# Patient Record
Sex: FEMALE | Race: WHITE
[De-identification: names, ages, dates, MRNs, and addresses within clinical notes are randomized per-mention and may not be internally consistent; named-entity substitution may affect disease eponyms.]

---

## 2018-04-17 ENCOUNTER — HOSPITAL ENCOUNTER (INPATIENT)
Dept: HOSPITAL 17 - NEPC | Age: 63
LOS: 7 days | Discharge: ANOTHER HOSPITAL | DRG: 885 | End: 2018-04-24
Attending: PSYCHIATRY & NEUROLOGY | Admitting: PSYCHIATRY & NEUROLOGY
Payer: COMMERCIAL

## 2018-04-17 VITALS
OXYGEN SATURATION: 96 % | RESPIRATION RATE: 18 BRPM | DIASTOLIC BLOOD PRESSURE: 91 MMHG | HEART RATE: 108 BPM | TEMPERATURE: 96.7 F | SYSTOLIC BLOOD PRESSURE: 204 MMHG

## 2018-04-17 VITALS
OXYGEN SATURATION: 96 % | DIASTOLIC BLOOD PRESSURE: 113 MMHG | SYSTOLIC BLOOD PRESSURE: 198 MMHG | HEART RATE: 111 BPM | RESPIRATION RATE: 18 BRPM

## 2018-04-17 DIAGNOSIS — E78.00: ICD-10-CM

## 2018-04-17 DIAGNOSIS — E78.5: ICD-10-CM

## 2018-04-17 DIAGNOSIS — F05: ICD-10-CM

## 2018-04-17 DIAGNOSIS — N39.0: ICD-10-CM

## 2018-04-17 DIAGNOSIS — M79.606: ICD-10-CM

## 2018-04-17 DIAGNOSIS — G62.9: ICD-10-CM

## 2018-04-17 DIAGNOSIS — E03.9: ICD-10-CM

## 2018-04-17 DIAGNOSIS — B96.20: ICD-10-CM

## 2018-04-17 DIAGNOSIS — R45.851: ICD-10-CM

## 2018-04-17 DIAGNOSIS — F17.210: ICD-10-CM

## 2018-04-17 DIAGNOSIS — F41.1: ICD-10-CM

## 2018-04-17 DIAGNOSIS — Z96.0: ICD-10-CM

## 2018-04-17 DIAGNOSIS — F41.8: ICD-10-CM

## 2018-04-17 DIAGNOSIS — M62.838: ICD-10-CM

## 2018-04-17 DIAGNOSIS — K21.9: ICD-10-CM

## 2018-04-17 DIAGNOSIS — I12.9: ICD-10-CM

## 2018-04-17 DIAGNOSIS — N17.9: ICD-10-CM

## 2018-04-17 DIAGNOSIS — E86.0: ICD-10-CM

## 2018-04-17 DIAGNOSIS — G25.9: ICD-10-CM

## 2018-04-17 DIAGNOSIS — Z66: ICD-10-CM

## 2018-04-17 DIAGNOSIS — B96.4: ICD-10-CM

## 2018-04-17 DIAGNOSIS — F29: Primary | ICD-10-CM

## 2018-04-17 DIAGNOSIS — R56.9: ICD-10-CM

## 2018-04-17 DIAGNOSIS — N31.9: ICD-10-CM

## 2018-04-17 DIAGNOSIS — G35: ICD-10-CM

## 2018-04-17 DIAGNOSIS — R40.20: ICD-10-CM

## 2018-04-17 DIAGNOSIS — Z79.899: ICD-10-CM

## 2018-04-17 DIAGNOSIS — G82.20: ICD-10-CM

## 2018-04-17 DIAGNOSIS — N18.3: ICD-10-CM

## 2018-04-17 LAB
ALBUMIN SERPL-MCNC: 3.1 GM/DL (ref 3.4–5)
ALP SERPL-CCNC: 112 U/L (ref 45–117)
ALT SERPL-CCNC: 28 U/L (ref 10–53)
AST SERPL-CCNC: 25 U/L (ref 15–37)
BILIRUB SERPL-MCNC: 0.4 MG/DL (ref 0.2–1)
BUN SERPL-MCNC: 26 MG/DL (ref 7–18)
CALCIUM SERPL-MCNC: 10.2 MG/DL (ref 8.5–10.1)
CHLORIDE SERPL-SCNC: 107 MEQ/L (ref 98–107)
CREAT SERPL-MCNC: 1.44 MG/DL (ref 0.5–1)
GFR SERPLBLD BASED ON 1.73 SQ M-ARVRAT: 37 ML/MIN (ref 89–?)
GLUCOSE SERPL-MCNC: 181 MG/DL (ref 74–106)
HCO3 BLD-SCNC: 23.3 MEQ/L (ref 21–32)
PROT SERPL-MCNC: 8.1 GM/DL (ref 6.4–8.2)
SODIUM SERPL-SCNC: 142 MEQ/L (ref 136–145)

## 2018-04-17 PROCEDURE — 84100 ASSAY OF PHOSPHORUS: CPT

## 2018-04-17 PROCEDURE — 84443 ASSAY THYROID STIM HORMONE: CPT

## 2018-04-17 PROCEDURE — 80048 BASIC METABOLIC PNL TOTAL CA: CPT

## 2018-04-17 PROCEDURE — 80307 DRUG TEST PRSMV CHEM ANLYZR: CPT

## 2018-04-17 PROCEDURE — 80061 LIPID PANEL: CPT

## 2018-04-17 PROCEDURE — 80053 COMPREHEN METABOLIC PANEL: CPT

## 2018-04-17 PROCEDURE — 83036 HEMOGLOBIN GLYCOSYLATED A1C: CPT

## 2018-04-17 PROCEDURE — 87086 URINE CULTURE/COLONY COUNT: CPT

## 2018-04-17 PROCEDURE — 83735 ASSAY OF MAGNESIUM: CPT

## 2018-04-17 PROCEDURE — 87077 CULTURE AEROBIC IDENTIFY: CPT

## 2018-04-17 PROCEDURE — 84439 ASSAY OF FREE THYROXINE: CPT

## 2018-04-17 PROCEDURE — 76937 US GUIDE VASCULAR ACCESS: CPT

## 2018-04-17 PROCEDURE — 85025 COMPLETE CBC W/AUTO DIFF WBC: CPT

## 2018-04-17 PROCEDURE — 96372 THER/PROPH/DIAG INJ SC/IM: CPT

## 2018-04-17 PROCEDURE — 81001 URINALYSIS AUTO W/SCOPE: CPT

## 2018-04-17 PROCEDURE — 87186 SC STD MICRODIL/AGAR DIL: CPT

## 2018-04-17 NOTE — PD
HPI


Chief Complaint:  Psychiatric Symptoms


Time Seen by Provider:  17:44


Travel History


International Travel<30 days:  No


Contact w/Intl Traveler<30days:  No


Traveled to known affect area:  No





History of Present Illness


HPI


63-year-old female that presents to the ED for evaluation of psychiatric 

illness.  Patient was brought here under a Rodriges act initiated by the patient's 

provider secondary to psychosis and suicidal ideation.  Patient has a 

significant history of MS and currently lives at the [facility].  Patient also has a 

history of anxiety as well as depression and apparently has been having 

episodes of psychosis while in the nursing home.  She actually feels her 

records from the nursing home started on Risperdal.  She apparently has been 

evaluated by psychiatrist in the nursing home.  Apparently today patient was 

found by staff having the course of call bell on her neck allegedly attempting 

to commit suicide.  Patient has no signs of injury to her neck.  She denies any 

pain.  Patient will not really provide any information and initially started 

talking to me and answering questions and then gets aggravated and then stops 

talking to me.  She denies any suicidal or homicidal ideation.  She denies any 

other medical problem.  She would not tell me what kind of medical issues she 

has or what medication she takes.  Most of the history is really obtained from 

ED nurse report as well as from the nursing home report.  She is a DNR per 

nursing home report.  Symptoms appear to have escalated for the past week.  

Culminating today with her allegedly attempting to put a cord on her neck.





PFSH


Past Medical History


High Cholesterol:  Yes


Diminished Hearing:  No


Hypertension:  Yes


Musculoskeletal:  Yes (MS )


Tetanus Vaccination:  Unknown


Influenza Vaccination:  No


Pregnant?:  Not Pregnant





Past Surgical History


Tonsillectomy:  Yes





Social History


Alcohol Use:  No


Tobacco Use:  Yes (1-2 cig )


Substance Use:  No





Allergies-Medications


(Allergen,Severity, Reaction):  


Coded Allergies:  


     hydromorphone (Verified  Allergy, Severe, 4/17/18)


     midazolam (Verified  Allergy, Severe, 4/17/18)


Reported Meds & Prescriptions





Reported Meds & Active Scripts


Active


Reported


Vitamin B-12 (Cyanocobalamin) 500 Mcg Tab 500 Mcg PO DAILY


Tylenol (Acetaminophen) 325 Mg Tab 650 Mg PO Q6H PRN


Cranberry (Cranberry (Vaccinium Macrocarpon)) 425 Mg Cap 425 Mg PO DAILY


Neurontin (Gabapentin) 100 Mg Cap 100 Mg PO TID


Amlodipine (Amlodipine Besylate) 5 Mg Tab 5 Mg PO DAILY


Losartan (Losartan Potassium) 25 Mg Tab 25 Mg PO DAILY


D3 (Cholecalciferol) 2,000 Unit Tab 2,000 Units PO DAILY


Levothyroxine (Levothyroxine Sodium) 112 Mcg Tab 112 Mcg PO DAILY


Myrbetriq (Mirabegron) 25 Mg Tab 25 Mg PO HS


Xanax (Alprazolam) 0.5 Mg Tab 0.5 Mg PO BID


Norco (Hydrocodone-Acetaminophen) 5 Mg-325 Mg Tab 1 Tab PO BID


Multiple Vitamin 1 Tab 1 Tab PO DAILY


Toprol XL (Metoprolol Succinate) 25 Mg Tab 25 Mg PO DAILY


     Hold for SBP < 110 or pulse < 60


Cymbalta DR (Duloxetine HCl) 60 Mg Capdr 60 Mg PO BID


Wellbutrin Xl 24 HR (Bupropion HCl) 150 Mg Tab 150 Mg PO DAILY


Norco (Hydrocodone-Acetaminophen) 5 Mg-325 Mg Tab 1 Tab PO Q6H PRN


Zanaflex (Tizanidine HCl) 4 Mg Cap 4 Mg PO Q6HR


Pepcid AC (Famotidine) 10 Mg Tablet 20 Mg PO DAILY


Clotrimazole Topical (Clotrimazole) 1% Cream 1 Applic TOPICAL Q8HR


     Apply to all skin folds for rash after soap & water wash q-shift


Artificial Tears (Dextran 70/Hypromellose) 1 Each Droperette 1 Drop EACH EYE BID


Risperdal (Risperidone) 0.5 Mg Tab 0.5 Mg PO HS








Review of Systems


ROS Limitations:  Uncooperative, Refused


Except as stated in HPI:  all other systems reviewed are Neg





Physical Exam


Exam Limitations:  Uncooperative, Refused


Narrative


GENERAL: 


SKIN: Warm and dry.  No obvious sign of neck injury or deformity.


HEAD: Atraumatic. Normocephalic. 


EYES: Pupils equal and round. No scleral icterus. No injection or drainage. 


ENT: No nasal bleeding or discharge.  Mucous membranes pink and moist.  Tongue 

is midline.  No uvula deviation.


NECK: Trachea midline. No JVD. 


CARDIOVASCULAR: Regular rate and rhythm.  No murmurs, S3, S4.


RESPIRATORY: No accessory muscle use. Clear to auscultation. Breath sounds 

equal bilaterally. 


GASTROINTESTINAL: Abdomen soft, non-tender, nondistended. Hepatic and splenic 

margins not palpable. 


MUSCULOSKELETAL: Extremities without clubbing, cyanosis, or edema. No obvious 

deformities.  Full range of motion of the upper and lower extremities 

bilaterally.  2+ pulses bilaterally.


NEUROLOGICAL: Awake and alert. No obvious cranial nerve deficits.  Motor 

grossly within normal limits. Five out of 5 muscle strength in the arms and 

legs.  Normal speech.


PSYCHIATRIC: Psychotic mood and affect; insight and judgment questionable





Data


Data


Last Documented VS





Vital Signs








  Date Time  Temp Pulse Resp B/P (MAP) Pulse Ox O2 Delivery O2 Flow Rate FiO2


 


4/17/18 17:49 96.7 108 18 204/91 (128) 96   








Orders





 Orders


Complete Blood Count With Diff (4/17/18 17:27)


Comprehensive Metabolic Panel (4/17/18 17:27)


Urinalysis - C+S If Indicated (4/17/18 17:27)


Psych Screen (4/17/18 17:27)


Drug Screen, Random Urine (4/17/18 17:27)


Alcohol (Ethanol) (4/17/18 17:27)


^ Sitter (4/17/18 17:57)


^ Sitter (4/17/18 18:01)


Thyroid Stimulating Hormone (4/17/18 19:58)


Restraints Violent (4/17/18 19:59)


Lorazepam Inj (Ativan Inj) (4/17/18 20:00)


Lorazepam Inj (Ativan Inj) (4/17/18 21:30)





Labs





Laboratory Tests








Test


  4/17/18


18:00


 


Blood Urea Nitrogen 26 MG/DL 


 


Creatinine 1.44 MG/DL 


 


Random Glucose 181 MG/DL 


 


Total Protein 8.1 GM/DL 


 


Albumin 3.1 GM/DL 


 


Calcium Level 10.2 MG/DL 


 


Alkaline Phosphatase 112 U/L 


 


Aspartate Amino Transf


(AST/SGOT) 25 U/L 


 


 


Alanine Aminotransferase


(ALT/SGPT) 28 U/L 


 


 


Total Bilirubin 0.4 MG/DL 


 


Sodium Level 142 MEQ/L 


 


Potassium Level 3.6 MEQ/L 


 


Chloride Level 107 MEQ/L 


 


Carbon Dioxide Level 23.3 MEQ/L 


 


Anion Gap 12 MEQ/L 


 


Estimat Glomerular Filtration


Rate 37 ML/MIN 


 


 


Ethyl Alcohol Level


  LESS THAN 3


MG/DL











MDM


Medical Decision Making


Medical Screen Exam Complete:  Yes


Emergency Medical Condition:  Yes


Medical Record Reviewed:  Yes


Interpretation(s)


CBC & BMP Diagram


4/17/18 18:00








Total Protein 8.1, Albumin 3.1 L, Calcium Level 10.2 H, Alkaline Phosphatase 112

, Aspartate Amino Transf (AST/SGOT) 25, Alanine Aminotransferase (ALT/SGPT) 28, 

Total Bilirubin 0.4


Differential Diagnosis


Depression versus suicidal ideation versus anxiety versus adjustment disorder 

versus mood disorder versus bipolar disorder versus schizophrenia versus 

paranoid disorder versus psychosis versus substance abuse versus alcohol abuse 

versus alcohol induced psychosis versus homicidality addition versus cutting 

versus personality disorder


Narrative Course


62-year-old female that presents to the ED for evaluation of psychiatric 

illness.  Patient is not a good historian and would not really collaborate with 

me or nursing staff.  From the records I obtained from the nursing home 

apparently patient has been having episodes of psychosis as well as depression 

will in the nursing home and she is actually been evaluated by psychiatrist who 

started her on some medications including Risperdal.  She appears to be 

escalating per the nursing home and that is what she was Rodriges acted in 

addition to having placed a cord on her neck today.  She appears to be no acute 

distress but she does appear to be somewhat psychotic.  At this time labs were 

drawn.  Patient will be medically cleared pending labs.  Okay to be seen by 

psych.  Elginter was ordered by me.


Mental health screening was discussed with the patient.





Diagnosis





 Primary Impression:  


 Unspecified psychosis


 Additional Impression:  


 Suicidal behavior


 Qualified Codes:  T14.91XA - Suicide attempt, initial encounter











Hector Cooley Apr 17, 2018 18:44

## 2018-04-18 VITALS
RESPIRATION RATE: 16 BRPM | HEART RATE: 108 BPM | TEMPERATURE: 97.9 F | SYSTOLIC BLOOD PRESSURE: 153 MMHG | OXYGEN SATURATION: 98 % | DIASTOLIC BLOOD PRESSURE: 89 MMHG

## 2018-04-18 LAB
AMORPHOUS SEDIMENT, URINE: (no result)
BACTERIA #/AREA URNS HPF: (no result) /HPF
BASOPHILS # BLD AUTO: 0.1 TH/MM3 (ref 0–0.2)
BASOPHILS NFR BLD: 1 % (ref 0–2)
COLOR UR: YELLOW
EOSINOPHIL # BLD: 0.1 TH/MM3 (ref 0–0.4)
EOSINOPHIL NFR BLD: 0.5 % (ref 0–4)
ERYTHROCYTE [DISTWIDTH] IN BLOOD BY AUTOMATED COUNT: 13 % (ref 11.6–17.2)
GLUCOSE UR STRIP-MCNC: (no result) MG/DL
HCT VFR BLD CALC: 48.7 % (ref 35–46)
HGB BLD-MCNC: 16.5 GM/DL (ref 11.6–15.3)
HGB UR QL STRIP: (no result)
KETONES UR STRIP-MCNC: (no result) MG/DL
LYMPHOCYTES # BLD AUTO: 2.4 TH/MM3 (ref 1–4.8)
LYMPHOCYTES NFR BLD AUTO: 21.1 % (ref 9–44)
MCH RBC QN AUTO: 31.8 PG (ref 27–34)
MCHC RBC AUTO-ENTMCNC: 33.8 % (ref 32–36)
MCV RBC AUTO: 94 FL (ref 80–100)
MONOCYTE #: 1.3 TH/MM3 (ref 0–0.9)
MONOCYTES NFR BLD: 11.2 % (ref 0–8)
MUCOUS THREADS #/AREA URNS LPF: (no result) /LPF
NEUTROPHILS # BLD AUTO: 7.5 TH/MM3 (ref 1.8–7.7)
NEUTROPHILS NFR BLD AUTO: 66.2 % (ref 16–70)
NITRITE UR QL STRIP: (no result)
PLATELET # BLD: 214 TH/MM3 (ref 150–450)
PMV BLD AUTO: 10.4 FL (ref 7–11)
RBC # BLD AUTO: 5.18 MIL/MM3 (ref 4–5.3)
SP GR UR STRIP: 1.02 (ref 1–1.03)
TRANS CELLS #/AREA URNS HPF: 1 /HPF
URINE LEUKOCYTE ESTERASE: (no result)
WBC # BLD AUTO: 11.3 TH/MM3 (ref 4–11)

## 2018-04-18 NOTE — PD
__________________________________________________





History of Present Illness


Chief Complaint:  Psychiatric Symptoms


Time Seen by Provider:  10:56


Travel History


International Travel<30 Days:  No


Contact w/Intl Traveler<30days:  No


Known affected area:  No





Legal Status


Legal Status:  Involuntary


Rodriges Act Signed By:  ZHOU CISNEROS ([id number])


Rodriges Act Comment:  CERTIFICATE OF PROFESSIONAL INITIATING INVOLUNTARY 

EXAMINATION4/17/18@1423


History of Present Illness:


This is a 62-year-old single,  female who resides in assisted living 

facility.  Patient presents under a Baker act for altered mental status and 

suicide attempts.  This is her first visit to this facility.





Reviewed electronic medical record, labs, discuss case with staff.  Patient was 

evaluated in her room in the ED main.  Patient was found awake and alert 

sitting on her bed.  When asked what brought her and patient refused to speak.  

She did follow me with her eyes and had a slight smile on her face.  Staff 

reports that the patient has been refusing care and when they attempt she states

, "you need to ask me permission before you do that".  Patient presented to 

this facility under a Baker act that states she has a history of depression and 

generalized anxiety disorder.  The facility reports that over the past few days 

the patient has had AMS and her behaviors have escalated.  They state that she 

has been attempting to get out of bed and falls due to having multiple 

sclerosis and being bedridden.  The care home staff reported finding her with the 

call light wrapped around her neck twice and their assessment noted red 

ligature  to her neck.  The patient was started on Risperdal on April 13 of 

this year however it is a small dose given her size.  She has been on 

Wellbutrin since approximately September 2017.  An attempt has been made to 

contact Morales Curry at [phone] who is the patient's brother and 

designated healthcare surrogate per the facility paperwork.  A message was left.





Formerly [facility]


Past Medical History


High Cholesterol:  Yes


Diminished Hearing:  No


Hypertension:  Yes


Musculoskeletal:  Yes (MS )


Tetanus Vaccination:  Unknown


Influenza Vaccination:  No


Pregnant?:  Not Pregnant





Past Surgical History


Tonsillectomy:  Yes





Psychiatric History


Psychiatric History


History of depression and generalized anxiety disorder.  Patient has been on 

Wellbutrin since September 2017 was recently started on Risperdal apparently in 

light of her altered mental status.


Hx Psychiatric Treatment:  


DEPRESSION/ ANNEL


History of Inpatient Treatment:  No





Social History


Unable to assess


Hx Alcohol Use:  No


Hx Tobacco Use:  Yes (1-2 cig )


Hx Substance Use:  No


Hx of Substance Use Treatment:  No





Family Psychiatric History


Unable to assess





Allergies-Medications


(Allergen,Severity, Reaction):  


Coded Allergies:  


     hydromorphone (Verified  Allergy, Severe, 4/17/18)


     midazolam (Verified  Allergy, Severe, 4/17/18)


Reported Meds & Prescriptions





Reported Meds & Active Scripts


Active


Reported


Vitamin B-12 (Cyanocobalamin) 500 Mcg Tab 500 Mcg PO DAILY


Tylenol (Acetaminophen) 325 Mg Tab 650 Mg PO Q6H PRN


Cranberry (Cranberry (Vaccinium Macrocarpon)) 425 Mg Cap 425 Mg PO DAILY


Neurontin (Gabapentin) 100 Mg Cap 100 Mg PO TID


Amlodipine (Amlodipine Besylate) 5 Mg Tab 5 Mg PO DAILY


Losartan (Losartan Potassium) 25 Mg Tab 25 Mg PO DAILY


D3 (Cholecalciferol) 2,000 Unit Tab 2,000 Units PO DAILY


Levothyroxine (Levothyroxine Sodium) 112 Mcg Tab 112 Mcg PO DAILY


Myrbetriq (Mirabegron) 25 Mg Tab 25 Mg PO HS


Xanax (Alprazolam) 0.5 Mg Tab 0.5 Mg PO BID


Norco (Hydrocodone-Acetaminophen) 5 Mg-325 Mg Tab 1 Tab PO BID


Multiple Vitamin 1 Tab 1 Tab PO DAILY


Toprol XL (Metoprolol Succinate) 25 Mg Tab 25 Mg PO DAILY


     Hold for SBP < 110 or pulse < 60


Cymbalta DR (Duloxetine HCl) 60 Mg Capdr 60 Mg PO BID


Wellbutrin Xl 24 HR (Bupropion HCl) 150 Mg Tab 150 Mg PO DAILY


Norco (Hydrocodone-Acetaminophen) 5 Mg-325 Mg Tab 1 Tab PO Q6H PRN


Zanaflex (Tizanidine HCl) 4 Mg Cap 4 Mg PO Q6HR


Pepcid AC (Famotidine) 10 Mg Tablet 20 Mg PO DAILY


Clotrimazole Topical (Clotrimazole) 1% Cream 1 Applic TOPICAL Q8HR


     Apply to all skin folds for rash after soap & water wash q-shift


Artificial Tears (Dextran 70/Hypromellose) 1 Each Droperette 1 Drop EACH EYE BID


Risperdal (Risperidone) 0.5 Mg Tab 0.5 Mg PO HS





Mental Status Examination


Appearance:  Disheveled


Consciousness:  Alert


Motor Activity:  Other (Bedridden)


Speech:  Other (Unable to assess)


Attention and Concentration:  Other (Unable to assess)


Mood:  Other (Unable to assess)


Affect:  Other (Unable to assess)


Thought Process & Associations:  Other (Unable to assess)


Thought Content:  Other (Unable to assess)


Hallucination Type:  Other (Unable to assess)


Delusion Type:  Other (Unable to assess)


Suicidal Ideation:  Yes (Unable to assess however Rodriges act reports patient had 

Colyte wrapped around her neck twice)


Suicidal Plan:  No (Unable to assess)


Suicidal Intention:  No (Unable to assess )


Homicidal Ideation:  No (Unable to assess)


Homicidal Plan:  No (Unable to assess)


Homicidal Intention:  No (Unable to assess)


Insight:  Poor


Judgment:  Poor





MDM


Medical Decision Making


Medical Record Reviewed:  Yes


Assessment/Plan


This is a 62-year-old single,  female who presents under Baker act to 

this facility.  It is her first time being seen here.  She has multiple 

comorbidities and resides in a SNF.  Staff report patient has had altered 

mental status for a couple of weeks now, she was started on Risperdal 5 days 

ago without apparent effect.  Per the Baker act patient's mental status has 

continued to deteriorate.  Staff reported to the psych ARNP that the patient 

was found with a call bowel wrapped around her neck at the SNF.  Patient 

refused to be voluntarily assessed.  A Rodriges act was placed and patient was 

transported to this facility.  The Baker act states, and I concur, that this 

patient poses a danger to herself and therefore meets Rodriges act criteria.  

Patient will be admitted for further evaluation and treatment as deemed 

necessary.





Orders





 Orders


Complete Blood Count With Diff (4/17/18 17:27)


Comprehensive Metabolic Panel (4/17/18 17:27)


Urinalysis - C+S If Indicated (4/17/18 17:27)


Psych Screen (4/17/18 17:27)


Drug Screen, Random Urine (4/17/18 17:27)


Alcohol (Ethanol) (4/17/18 17:27)


^ Sitter (4/17/18 17:57)


^ Sitter (4/17/18 18:01)


Lorazepam Inj (Ativan Inj) (4/17/18 20:00)


Lorazepam Inj (Ativan Inj) (4/17/18 21:30)


Restraints Non-Violent HANSA.Q3H (4/17/18 21:36)


Thyroid Stimulating Hormone (4/17/18 18:00)


Clonidine (Catapres) (4/18/18 03:15)


Lorazepam Inj (Ativan Inj) (4/18/18 04:30)


Admit Order (Ed Use Only) (4/18/18 10:21)


Admit To Inpatient Psych (4/18/18 )


Code Status (4/18/18 10:21)


Vital Signs (Adult) HANSA.Q12H.E (4/18/18 10:21)


Level Of Observation (Psych) (4/18/18 10:21)


Diet Diabetic (4/18/18 Lunch)


Acetaminophen (Tylenol) (4/18/18 10:30)


Magnesium Hydroxide Liq (Milk Of Magnesi (4/18/18 10:30)


Al-Mag Hy-Si 40-40-4 Mg/Ml Liq (Mag-Al P (4/18/18 10:30)


Basic Metabolic Panel (Bmp) (4/19/18 06:00)


Lipid Profile (4/19/18 06:00)


Hemoglobin (Hgb) A1c (4/19/18 06:00)





Results





Vital Signs








  Date Time  Temp Pulse Resp B/P (MAP) Pulse Ox O2 Delivery O2 Flow Rate FiO2


 


4/17/18 23:38  111 18 198/113 (141) 96 Room Air  


 


4/17/18 17:49 96.7 108 18 204/91 (128) 96   











Laboratory Tests








Test


  4/17/18


18:00 4/18/18


00:17


 


Blood Urea Nitrogen 26  


 


Creatinine 1.44  


 


Random Glucose 181  


 


Total Protein 8.1  


 


Albumin 3.1  


 


Calcium Level 10.2  


 


Alkaline Phosphatase 112  


 


Aspartate Amino Transf


(AST/SGOT) 25 


  


 


 


Alanine Aminotransferase


(ALT/SGPT) 28 


  


 


 


Total Bilirubin 0.4  


 


Sodium Level 142  


 


Potassium Level 3.6  


 


Chloride Level 107  


 


Carbon Dioxide Level 23.3  


 


Anion Gap 12  


 


Estimat Glomerular Filtration


Rate 37 


  


 


 


Thyroid Stimulating Hormone


3rd Gen 1.570 


  


 


 


Ethyl Alcohol Level LESS THAN 3  


 


White Blood Count  11.3 


 


Red Blood Count  5.18 


 


Hemoglobin  16.5 


 


Hematocrit  48.7 


 


Mean Corpuscular Volume  94.0 


 


Mean Corpuscular Hemoglobin  31.8 


 


Mean Corpuscular Hemoglobin


Concent 


  33.8 


 


 


Red Cell Distribution Width  13.0 


 


Platelet Count  214 


 


Mean Platelet Volume  10.4 


 


Neutrophils (%) (Auto)  66.2 


 


Lymphocytes (%) (Auto)  21.1 


 


Monocytes (%) (Auto)  11.2 


 


Eosinophils (%) (Auto)  0.5 


 


Basophils (%) (Auto)  1.0 


 


Neutrophils # (Auto)  7.5 


 


Lymphocytes # (Auto)  2.4 


 


Monocytes # (Auto)  1.3 


 


Eosinophils # (Auto)  0.1 


 


Basophils # (Auto)  0.1 


 


CBC Comment  DIFF FINAL 


 


Differential Comment   











Diagnosis





 Primary Impression:  


 Major depressive disorder, single episode, severe with psychotic features





Admitting Information


Admitting Physician Requests:  Admit











Darshana Dudley Apr 18, 2018 11:54

## 2018-04-19 VITALS
OXYGEN SATURATION: 99 % | DIASTOLIC BLOOD PRESSURE: 71 MMHG | HEART RATE: 95 BPM | RESPIRATION RATE: 18 BRPM | SYSTOLIC BLOOD PRESSURE: 147 MMHG

## 2018-04-19 VITALS
HEART RATE: 89 BPM | SYSTOLIC BLOOD PRESSURE: 125 MMHG | TEMPERATURE: 98.4 F | RESPIRATION RATE: 16 BRPM | DIASTOLIC BLOOD PRESSURE: 67 MMHG | OXYGEN SATURATION: 98 %

## 2018-04-19 VITALS
OXYGEN SATURATION: 95 % | TEMPERATURE: 98.2 F | SYSTOLIC BLOOD PRESSURE: 92 MMHG | RESPIRATION RATE: 16 BRPM | HEART RATE: 67 BPM | DIASTOLIC BLOOD PRESSURE: 57 MMHG

## 2018-04-19 LAB
BUN SERPL-MCNC: 22 MG/DL (ref 7–18)
CALCIUM SERPL-MCNC: 9.9 MG/DL (ref 8.5–10.1)
CHLORIDE SERPL-SCNC: 108 MEQ/L (ref 98–107)
CHOLEST SERPL-MCNC: 160 MG/DL (ref 120–200)
CHOLESTEROL/ HDL RATIO: 3.61 RATIO
CREAT SERPL-MCNC: 1.25 MG/DL (ref 0.5–1)
GFR SERPLBLD BASED ON 1.73 SQ M-ARVRAT: 43 ML/MIN (ref 89–?)
GLUCOSE SERPL-MCNC: 139 MG/DL (ref 74–106)
HBA1C MFR BLD: 5.8 % (ref 4.3–6)
HCO3 BLD-SCNC: 26.3 MEQ/L (ref 21–32)
HDLC SERPL-MCNC: 44.2 MG/DL (ref 40–60)
LDLC SERPL-MCNC: 91 MG/DL (ref 0–99)
SODIUM SERPL-SCNC: 142 MEQ/L (ref 136–145)
TRIGL SERPL-MCNC: 124 MG/DL (ref 42–150)

## 2018-04-19 RX ADMIN — GABAPENTIN SCH MG: 100 CAPSULE ORAL at 18:00

## 2018-04-19 RX ADMIN — BUPROPION HYDROCHLORIDE SCH MG: 150 TABLET, FILM COATED ORAL at 11:43

## 2018-04-19 RX ADMIN — HYDROCODONE BITARTRATE AND ACETAMINOPHEN SCH TAB: 5; 325 TABLET ORAL at 21:00

## 2018-04-19 RX ADMIN — DULOXETINE SCH MG: 60 CAPSULE, DELAYED RELEASE ORAL at 11:43

## 2018-04-19 RX ADMIN — LEVOTHYROXINE SODIUM SCH MCG: 112 TABLET ORAL at 09:00

## 2018-04-19 RX ADMIN — GABAPENTIN SCH MG: 100 CAPSULE ORAL at 10:49

## 2018-04-19 RX ADMIN — HYDROCODONE BITARTRATE AND ACETAMINOPHEN SCH TAB: 5; 325 TABLET ORAL at 09:00

## 2018-04-19 RX ADMIN — CYANOCOBALAMIN TAB 1000 MCG SCH MCG: 1000 TAB at 10:48

## 2018-04-19 RX ADMIN — ACETAMINOPHEN PRN MG: 325 TABLET ORAL at 04:20

## 2018-04-19 RX ADMIN — FAMOTIDINE SCH MG: 20 TABLET, FILM COATED ORAL at 10:47

## 2018-04-19 RX ADMIN — POLYVINYL ALCOHOL SCH DROP: 14 SOLUTION/ DROPS OPHTHALMIC at 10:50

## 2018-04-19 RX ADMIN — CLOTRIMAZOLE SCH APPLIC: 10 CREAM TOPICAL at 21:12

## 2018-04-19 RX ADMIN — LOSARTAN POTASSIUM SCH MG: 25 TABLET, FILM COATED ORAL at 10:48

## 2018-04-19 RX ADMIN — HEPARIN SODIUM SCH UNITS: 10000 INJECTION, SOLUTION INTRAVENOUS; SUBCUTANEOUS at 21:13

## 2018-04-19 RX ADMIN — POLYVINYL ALCOHOL SCH DROP: 14 SOLUTION/ DROPS OPHTHALMIC at 21:00

## 2018-04-19 RX ADMIN — CLOTRIMAZOLE SCH APPLIC: 10 CREAM TOPICAL at 15:46

## 2018-04-19 RX ADMIN — VITAMIN D, TAB 1000IU (100/BT) SCH UNITS: 25 TAB at 10:49

## 2018-04-19 RX ADMIN — ACETAMINOPHEN PRN MG: 325 TABLET ORAL at 18:04

## 2018-04-19 RX ADMIN — GABAPENTIN SCH MG: 100 CAPSULE ORAL at 15:14

## 2018-04-19 RX ADMIN — HEPARIN SODIUM SCH UNITS: 10000 INJECTION, SOLUTION INTRAVENOUS; SUBCUTANEOUS at 15:45

## 2018-04-19 RX ADMIN — ACYCLOVIR SCH TAB: 800 TABLET ORAL at 10:48

## 2018-04-19 RX ADMIN — DULOXETINE SCH MG: 60 CAPSULE, DELAYED RELEASE ORAL at 21:12

## 2018-04-19 RX ADMIN — METOPROLOL SUCCINATE SCH MG: 25 TABLET, EXTENDED RELEASE ORAL at 10:49
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
## 2018-04-19 NOTE — PD.CONS
HPI


Service


[facility]


Consult Requested By


 DR CAROLA DAVID MD


Reason for Consult


Help with medical management multiple sclerosis


Primary Care Physician


Martínez Ching M.D.


Diagnoses:  


(1) Multiple sclerosis


History of Present Illness


The patient is a 62-year-old  female.  Who lives at an assisted living 

facility.  With psychiatric history of depression and anxiety as well as 

history of hypertension, hyperlipidemia, multiple sclerosis, and chronic Boss 

catheter.  Patient has been admitted to psychiatry under Baker act due to 

questionable suicidal gestures while putting a cord around her neck.


We have been asked to help regarding medical management and help with multiple 

sclerosis will consult physical therapy and Occupational Therapy





Review of Systems


Constitutional:  COMPLAINS OF: Fatigue, DENIES: Diaphoretic episodes, Fever, 

Weight gain, Weight loss, Chills, Dizziness, Change in appetite, Night Sweats


Endocrine:  DENIES: Abnorml menstrual pattern, Heat/cold intolerance, Polydipsia

, Polyuria, Polyphagia


Eyes:  DENIES: Blurred vision, Diplopia, Eye inflammation, Eye pain, Vision loss

, Photosensitivity, Double Vision


Ears, nose, mouth, throat:  DENIES: Tinnitus, Hearing loss, Vertigo, Nasal 

discharge, Oral lesions, Throat pain, Hoarseness, Ear Pain, Running Nose, 

Epistaxis, Sinus Pain


Respiratory:  DENIES: Apneas, Cough, Snoring, Wheezing, Hemoptysis, Sputum 

production, Shortness of breath


Cardiovascular:  DENIES: Chest pain, Palpitations, Syncope, Dyspnea on Exertion

, PND, Lower Extremity Edema, Orthopnea, Claudication


Gastrointestinal:  DENIES: Abdominal pain, Black stools, Bloody stools, 

Constipation, Diarrhea


Genitourinary:  DENIES: Abnormal vaginal bleeding, Dysmenorrhea, Dyspareunia, 

Sexual dysfunction


Musculoskeletal:  COMPLAINS OF: Joint pain, DENIES: Muscle aches, Stiffness, 

Joint Swelling, Back pain, Neck pain


Integumentary:  DENIES: Abnormal pigmentation, Pruritus, Rash, Nail changes, 

Breast masses, Breast skin changes


Hematologic/lymphatic:  DENIES: Bruising, Lymphadenopathy


Immunologic/allergic:  DENIES: Eczema, Urticaria


Neurologic:  COMPLAINS OF: Abnormal gait, DENIES: Headache, Localized weakness, 

Paresthesias, Seizures, Speech Problems, Tremor, Poor Balance


Psychiatric:  COMPLAINS OF: Anxiety, Mood changes, Depression, DENIES: Confusion

, Hallucinations, Agitation, Suicidal Ideation, Homicidal Ideation, Delusions


Except as stated in HPI:  all other systems reviewed are Neg





Past Family Social History


Allergies:  


Coded Allergies:  


     hydromorphone (Verified  Allergy, Severe, 4/17/18)


     midazolam (Verified  Allergy, Severe, 4/17/18)


Past Medical History


Multiple sclerosis


Hypertension


Hyperlipidemia


GERD


Hypothyroidism


Chronic kidney disease stage II-III


Anxiety depression


History of cataract


Chronic Boss catheter


Past Surgical History


Tonsillectomy


Cataract


Reported Medications





Reported Meds & Active Scripts


Active


Reported


Vitamin B-12 (Cyanocobalamin) 500 Mcg Tab 500 Mcg PO DAILY


Tylenol (Acetaminophen) 325 Mg Tab 650 Mg PO Q6H PRN


Cranberry (Cranberry (Vaccinium Macrocarpon)) 425 Mg Cap 425 Mg PO DAILY


Neurontin (Gabapentin) 100 Mg Cap 100 Mg PO TID


Amlodipine (Amlodipine Besylate) 5 Mg Tab 5 Mg PO DAILY


Losartan (Losartan Potassium) 25 Mg Tab 25 Mg PO DAILY


D3 (Cholecalciferol) 2,000 Unit Tab 2,000 Units PO DAILY


Levothyroxine (Levothyroxine Sodium) 112 Mcg Tab 112 Mcg PO DAILY


Myrbetriq (Mirabegron) 25 Mg Tab 25 Mg PO HS


Xanax (Alprazolam) 0.5 Mg Tab 0.5 Mg PO BID


Norco (Hydrocodone-Acetaminophen) 5 Mg-325 Mg Tab 1 Tab PO BID


Multiple Vitamin 1 Tab 1 Tab PO DAILY


Toprol XL (Metoprolol Succinate) 25 Mg Tab 25 Mg PO DAILY


     Hold for SBP < 110 or pulse < 60


Cymbalta DR (Duloxetine HCl) 60 Mg Capdr 60 Mg PO BID


Wellbutrin Xl 24 HR (Bupropion HCl) 150 Mg Tab 150 Mg PO DAILY


Norco (Hydrocodone-Acetaminophen) 5 Mg-325 Mg Tab 1 Tab PO Q6H PRN


Zanaflex (Tizanidine HCl) 4 Mg Cap 4 Mg PO Q6HR


Pepcid AC (Famotidine) 10 Mg Tablet 20 Mg PO DAILY


Clotrimazole Topical (Clotrimazole) 1% Cream 1 Applic TOPICAL Q8HR


     Apply to all skin folds for rash after soap & water wash q-shift


Artificial Tears (Dextran 70/Hypromellose) 1 Each Droperette 1 Drop EACH EYE BID


Risperdal (Risperidone) 0.5 Mg Tab 0.5 Mg PO HS


Active Ordered Medications





Current Medications


Lorazepam (Ativan Inj) 1 mg ONCE  ONCE IV PUSH ;  Start 4/17/18 at 20:00;  Stop 

4/17/18 at 21:38;  Status DC


Lorazepam (Ativan Inj) 2 mg ONCE  ONCE IM  Last administered on 4/17/18at 23:56

;  Start 4/17/18 at 21:30;  Stop 4/17/18 at 21:31;  Status DC


Clonidine (Catapres) 0.1 mg ONCE  ONCE PO ;  Start 4/18/18 at 03:15;  Stop 4/18/ 18 at 06:17;  Status DC


Lorazepam (Ativan Inj) 1 mg ONCE  ONCE IM  Last administered on 4/18/18at 05:48

;  Start 4/18/18 at 04:30;  Stop 4/18/18 at 04:31;  Status DC


Acetaminophen (Tylenol) 650 mg Q4H  PRN PO Pain 1-5 or Temp >101F Last 

administered on 4/19/18at 04:20;  Start 4/18/18 at 10:30


Magnesium Hydroxide (Milk Of Magnesia Liq) 30 ml DAILY  PRN PO CONSTIPATION;  

Start 4/18/18 at 10:30


Al Hydrox/Mg Hydrox/Simethicone (Mag-Al Plus Susp Liq) 30 ml Q6H  PRN PO 

DYSPEPSIA Last administered on 4/19/18at 04:18;  Start 4/18/18 at 10:30


Bupropion HCl (Wellbutrin Sr) 150 mg DAILY PO ;  Start 4/19/18 at 09:00


Duloxetine HCl (Cymbalta Dr) 60 mg BID PO ;  Start 4/19/18 at 09:00


Gabapentin (Neurontin) 100 mg TID PO ;  Start 4/19/18 at 09:00


Levothyroxine Sodium (Synthroid) 112 mcg DAILY PO ;  Start 4/19/18 at 09:00


Artificial Tears (Tears Naturale Opth Soln) 1 drop BID EACH EYE ;  Start 4/19/ 18 at 09:00


Multivitamins (Theragran) 1 tab DAILY PO ;  Start 4/19/18 at 09:00


Amlodipine Besylate (Norvasc) 5 mg DAILY PO ;  Start 4/19/18 at 09:00


Clotrimazole (Lotrimin 1% Cream) 1 applic Q8HR TOPICAL ;  Start 4/19/18 at 14:00


Cyanocobalamin (Vitamin B12) 500 mcg DAILY PO ;  Start 4/19/18 at 09:00


Acetaminophen/ Hydrocodone Bitart (Norco  5-325 Mg) 1 tab BID PO ;  Start 4/19/ 18 at 09:00


Acetaminophen/ Hydrocodone Bitart (Norco  5-325 Mg) 1 tab Q6H  PRN PO 

BREAKTHROUGH PAIN;  Start 4/19/18 at 09:00


Losartan Potassium (Cozaar) 25 mg DAILY PO ;  Start 4/19/18 at 09:00


Metoprolol Succinate (Toprol Xl) 25 mg DAILY PO ;  Start 4/19/18 at 09:00


Tizanidine HCl (Zanaflex) 4 mg Q6HR PO ;  Start 4/19/18 at 12:00


Cholecalciferol (Vitamin D3) 2,000 units DAILY PO ;  Start 4/19/18 at 09:00


Famotidine (Pepcid) 20 mg DAILY PO ;  Start 4/19/18 at 09:00


Family History


Depression


Social History


Still smokes 1-2 cigarettes a day


Denies any tobacco other than that


Denies any illicits


Denies any alcohol





Physical Exam


Vital Signs





Vital Signs








  Date Time  Temp Pulse Resp B/P (MAP) Pulse Ox O2 Delivery O2 Flow Rate FiO2


 


4/19/18 06:23 98.2 67 16 92/57 (69) 95   


 


4/18/18 18:00 97.9 108 16 153/89 (110) 98   


 


4/18/18 11:48        








Physical Exam


GENERAL: This is a well-nourished, well-developed patient, in no apparent 

distress.  Has bilateral lower extremity weakness from her multiple sclerosis


SKIN: No rashes, ecchymoses or lesions. Cool and dry.


HEAD: Atraumatic. Normocephalic. No temporal or scalp tenderness.


EYES: Pupils equal round and reactive. Extraocular motions intact. No scleral 

icterus. No injection or drainage. 


ENT: Nose without bleeding, purulent drainage or septal hematoma. Throat 

without erythema, tonsillar hypertrophy or exudate. Uvula midline. Airway 

patent.


NECK: Trachea midline. No JVD or lymphadenopathy. Supple, nontender, no 

meningeal signs.


CARDIOVASCULAR: Regular rate and rhythm without murmurs, gallops, or rubs.  S1-

S2 no S3 or S4


RESPIRATORY: Clear to auscultation. Breath sounds equal bilaterally. No wheezes

, rales, or rhonchi.  


GASTROINTESTINAL: Abdomen soft, non-tender, nondistended. No hepato-splenomegaly

, or palpable masses. No guarding.  Obese chronic Boss catheter


MUSCULOSKELETAL: Extremities without clubbing, cyanosis, or edema. No joint 

tenderness, effusion, or edema noted. No calf tenderness. Negative Homans sign 

bilaterally.  Bilateral lower extremity weakness muscle strength is about 3 out 

of 5


NEUROLOGICAL: Awake and alert. Cranial nerves II through XII intact.  Motor and 

sensory grossly within normal limits. Five out of 5 muscle strength in all 

muscle groups in the upper extremities.  Normal speech.


Insight and judgment is limited


Mood and behavior is appropriate


Laboratory





Laboratory Tests








Test


  4/19/18


07:18


 


Blood Urea Nitrogen 22 


 


Creatinine 1.25 


 


Random Glucose 139 


 


Calcium Level 9.9 


 


Sodium Level 142 


 


Potassium Level 3.6 


 


Chloride Level 108 


 


Carbon Dioxide Level 26.3 


 


Anion Gap 8 


 


Estimat Glomerular Filtration


Rate 43 


 


 


Triglycerides Level 124 


 


Cholesterol Level 160 


 


LDL Cholesterol 91 


 


HDL Cholesterol 44.2 


 


Cholesterol/HDL Ratio 3.61 














 Date/Time


Source Procedure


Growth Status


 


 


 4/17/18 14:40


Urine Random Urine Urine Culture


Pending Received








Result Diagram:  


4/18/18 0017                                                                   

             4/19/18 0718








Assessment and Plan


Assessment and Plan


Possible suicidal gesture with anxiety and depression will defer to psychiatry





Multiple sclerosis continue on medications for muscle spasms and continue 

physical therapy and Occupational Therapy continue on Zanaflex





Hypertension continue home medications continue on the amlodipine, losartan





Hyperlipidemia continue home medications





GERD continue home medications continue on  famotidine





Chronic Boss catheter continue to monitor and keep the Boss in place-suspect 

due to neurogenic bladder from the multiple sclerosis





Hypothyroidism check TSH and free T4 continue on Synthroid





GI prophylaxis with famotidine





DVT prophylaxis with subcu heparin


Code Status


Full code


Discussed Condition With


RN and patient











Arnol Juarez DO Apr 19, 2018 09:38

## 2018-04-20 VITALS
DIASTOLIC BLOOD PRESSURE: 74 MMHG | SYSTOLIC BLOOD PRESSURE: 172 MMHG | HEART RATE: 83 BPM | TEMPERATURE: 97.2 F | OXYGEN SATURATION: 98 % | RESPIRATION RATE: 16 BRPM

## 2018-04-20 VITALS
TEMPERATURE: 97.7 F | OXYGEN SATURATION: 99 % | RESPIRATION RATE: 16 BRPM | SYSTOLIC BLOOD PRESSURE: 133 MMHG | HEART RATE: 78 BPM | DIASTOLIC BLOOD PRESSURE: 66 MMHG

## 2018-04-20 LAB
BACTERIA #/AREA URNS HPF: (no result) /HPF
COLOR UR: YELLOW
GLUCOSE UR STRIP-MCNC: (no result) MG/DL
HGB UR QL STRIP: (no result)
KETONES UR STRIP-MCNC: (no result) MG/DL
MUCOUS THREADS #/AREA URNS LPF: (no result) /LPF
NITRITE UR QL STRIP: (no result)
SP GR UR STRIP: 1.02 (ref 1–1.03)
SQUAMOUS #/AREA URNS HPF: 7 /HPF (ref 0–5)
URINE LEUKOCYTE ESTERASE: (no result)
WHITE BLOOD CELL CLUMPS: (no result)

## 2018-04-20 RX ADMIN — HEPARIN SODIUM SCH UNITS: 10000 INJECTION, SOLUTION INTRAVENOUS; SUBCUTANEOUS at 05:05

## 2018-04-20 RX ADMIN — HEPARIN SODIUM SCH UNITS: 10000 INJECTION, SOLUTION INTRAVENOUS; SUBCUTANEOUS at 16:02

## 2018-04-20 RX ADMIN — GABAPENTIN SCH MG: 100 CAPSULE ORAL at 11:47

## 2018-04-20 RX ADMIN — CYANOCOBALAMIN TAB 1000 MCG SCH MCG: 1000 TAB at 11:51

## 2018-04-20 RX ADMIN — POLYVINYL ALCOHOL SCH DROP: 14 SOLUTION/ DROPS OPHTHALMIC at 21:00

## 2018-04-20 RX ADMIN — CLOTRIMAZOLE SCH APPLIC: 10 CREAM TOPICAL at 16:03

## 2018-04-20 RX ADMIN — LEVOTHYROXINE SODIUM SCH MCG: 112 TABLET ORAL at 09:00

## 2018-04-20 RX ADMIN — HEPARIN SODIUM SCH UNITS: 10000 INJECTION, SOLUTION INTRAVENOUS; SUBCUTANEOUS at 21:15

## 2018-04-20 RX ADMIN — LOSARTAN POTASSIUM SCH MG: 25 TABLET, FILM COATED ORAL at 11:49

## 2018-04-20 RX ADMIN — METOPROLOL SUCCINATE SCH MG: 25 TABLET, EXTENDED RELEASE ORAL at 11:54

## 2018-04-20 RX ADMIN — HYDROCODONE BITARTRATE AND ACETAMINOPHEN SCH TAB: 5; 325 TABLET ORAL at 09:00

## 2018-04-20 RX ADMIN — FAMOTIDINE SCH MG: 20 TABLET, FILM COATED ORAL at 11:54

## 2018-04-20 RX ADMIN — DULOXETINE SCH MG: 60 CAPSULE, DELAYED RELEASE ORAL at 21:15

## 2018-04-20 RX ADMIN — GABAPENTIN SCH MG: 100 CAPSULE ORAL at 18:00

## 2018-04-20 RX ADMIN — CLOTRIMAZOLE SCH APPLIC: 10 CREAM TOPICAL at 05:20

## 2018-04-20 RX ADMIN — HYDROCODONE BITARTRATE AND ACETAMINOPHEN SCH TAB: 5; 325 TABLET ORAL at 20:35

## 2018-04-20 RX ADMIN — SULFAMETHOXAZOLE AND TRIMETHOPRIM SCH TAB: 800; 160 TABLET ORAL at 11:55

## 2018-04-20 RX ADMIN — POLYVINYL ALCOHOL SCH DROP: 14 SOLUTION/ DROPS OPHTHALMIC at 09:00

## 2018-04-20 RX ADMIN — VITAMIN D, TAB 1000IU (100/BT) SCH UNITS: 25 TAB at 11:47

## 2018-04-20 RX ADMIN — ACYCLOVIR SCH TAB: 800 TABLET ORAL at 11:52

## 2018-04-20 RX ADMIN — SULFAMETHOXAZOLE AND TRIMETHOPRIM SCH TAB: 800; 160 TABLET ORAL at 21:15

## 2018-04-20 RX ADMIN — BUPROPION HYDROCHLORIDE SCH MG: 150 TABLET, FILM COATED ORAL at 11:51

## 2018-04-20 RX ADMIN — CLOTRIMAZOLE SCH APPLIC: 10 CREAM TOPICAL at 21:14

## 2018-04-20 RX ADMIN — DULOXETINE SCH MG: 60 CAPSULE, DELAYED RELEASE ORAL at 11:48

## 2018-04-20 RX ADMIN — GABAPENTIN SCH MG: 100 CAPSULE ORAL at 12:10

## 2018-04-20 NOTE — HHI.PR
Subjective


Remarks


Pt tells me that she feels she has a UTI because usually when she does she gets 

irritated and also has the urge. She does have a hx of chronic UTI and she 

thinks the last time she got one was  about 1 year ago and she took bactrim 

which worked for her. She would prefer to stay away from IVs for now. Pt denies 

any fevers or chills. no nausea or vomiting. tolerating a diet. feels 

constipated at times





Objective


Vitals





Vital Signs








  Date Time  Temp Pulse Resp B/P (MAP) Pulse Ox O2 Delivery O2 Flow Rate FiO2


 


4/20/18 07:00 97.7 78 16 133/66 (88) 99   


 


4/19/18 18:33 98.4 89 16 125/67 (86) 98   


 


4/19/18 10:46  95 18 147/71 (96) 99   














I/O      


 


 4/19/18 4/19/18 4/19/18 4/20/18 4/20/18 4/20/18





 07:00 15:00 23:00 07:00 15:00 23:00


 


Intake Total 0 ml 2160 ml 960 ml   


 


Output Total 500 ml  260 ml   


 


Balance -500 ml 2160 ml 700 ml   


 


      


 


Intake Oral 0 ml 2160 ml 600 ml   


 


Tube Feeding   360 ml   


 


Output Urine Total 500 ml  260 ml   


 


# Voids     3 


 


# Bowel Movements   3   








Result Diagram:  


4/18/18 0017                                                                   

             4/19/18 0718





Objective Remarks


GENERAL: pleasant.  Has bilateral lower extremity weakness from her multiple 

sclerosis


CARDIOVASCULAR: Regular rate and rhythm without murmurs


RESPIRATORY: Clear to auscultation. Breath sounds equal bilaterally. No wheezes


GASTROINTESTINAL: Abdomen soft, non-tender, nondistended.  Obese chronic Blake 

catheter


MUSCULOSKELETAL: Extremities without edema.  Bilateral lower extremity weakness 

muscle strength is about 3 out of 5


NEUROLOGICAL: Awake and alert.  Normal speech.


Mood and behavior are appropriate





A/P


Problem List:  


(1) Multiple sclerosis


ICD Code:  G35 - Multiple sclerosis


Assessment and Plan


Possible suicidal gesture with anxiety and depression will defer to psychiatry





Multiple sclerosis continue on medications for muscle spasms and continue 

physical therapy and Occupational Therapy continue on Zanaflex





Hypertension: stable, continue home medications continue on the amlodipine, 

losartan





Hyperlipidemia continue home medications





GERD continue home medications continue on  famotidine





UTI/Chronic Blake catheter continue to monitor- suspect due to neurogenic 

bladder from the multiple sclerosis. UA is growing gram neg rods. Pt feels that 

she does have a UTI and tells me "I know I do". Last time she was treated was w 

bactrim, I will go ahead and start it and f/u final urine cx. I will also have 

RN change blake and repeat UA after new blake placed. Abx to be given after new 

u/a obtained





Hypothyroidism: TSH and free T4 wnl, continue on Synthroid





GI prophylaxis with famotidine





DVT prophylaxis with subcu heparin


Discharge Planning


per primary team











Christine García MD Apr 20, 2018 09:10
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
## 2018-04-20 NOTE — MB
cc:

Nathan Faye MD

****

 

 

DATE:

04/20/2018

 

HISTORY OF PRESENT ILLNESS:

Dominique Edouard is seen for neurological consultation.  This is a 

62-year-old woman seen in the psychiatric unit for altered mental 

status that apparently has been present for a few weeks.

 

The patient is unable to provide history in much detail.  Chart is 

reviewed.  I spoke to the nursing staff.  The patient has a history of

multiple sclerosis, but does not appear to be on any medications for 

this.  She has been not able to walk for 18 years and she has a severe

paraparesis.  She has a psychiatric history, also chronic.  She has 

been living in an assisted living facility, as far as I can gather.  

She has been somewhat suicidal and was brought to the hospital under 

Baker Act.

 

MEDICATIONS:

Reportedly include Risperdal, Pepcid, Zanaflex, Norco, Wellbutrin, 

Cymbalta, Toprol, Norco, Xanax, Myrbetriq, levothyroxine, vitamins, 

losartan, amlodipine, Neurontin 100 mg 3 times a day and B12.

 

NEUROLOGIC EXAMINATION:

The patient was a rather vague and at times was joking, but appeared 

to be in no distress.  She is circumstantial.  When coached 

adequately, she answered some questions.  She is actually oriented, 

though she thought the date was [birth year], which is her father's birthday.

 She knew the year, knows the place and she knows about the multiple 

sclerosis history and when pressed, she tells me she does not take any

medication because they would not help.  She is obese and was in no 

distress.  Neck is supple.  Ocular movements and visual fields full.  

She was picking on her identification around her wrist.  She moved the

upper extremities well, gazed well.  The reflexes were essentially 

absent throughout.  The left plantar response is extensor.  She did 

not participate much in trying to move the lower extremities, but 

appeared that she has about 2-3/5 on the lower extremities.

 

ASSESSMENT:

History of multiple sclerosis.  Paraplegic/paraparesis.  Chronic 

urinary tract infection.  Psychiatric history including admission now 

under Baker Act for suicide thoughts.

 

RECOMMENDATIONS:

Neurologic-wise, she appears to be stable and I do not think we need 

any intervention.  She is encephalopathic, but this may well be all 

from the psychiatric illness ,medical problems including urinary tract

infection.  She is on an extended amount of medications and these also

could be interfering.  From my standpoint, we actually might even stop

the Zanaflex, as I doubt this is doing very much that we could see.  

The gabapentin is a very small dose and it should not be a factor.

 

Thank you for asking us to assist in her care.  Please call us back 

wilmer.

 

 

__________________________________

MD TAMIA Sanchez/KAREL

D: 04/20/2018, 01:01 PM

T: 04/20/2018, 01:36 PM

Visit #: [id number]

Job #: [id number]

## 2018-04-21 VITALS
RESPIRATION RATE: 16 BRPM | TEMPERATURE: 98.3 F | SYSTOLIC BLOOD PRESSURE: 171 MMHG | OXYGEN SATURATION: 96 % | HEART RATE: 78 BPM | DIASTOLIC BLOOD PRESSURE: 81 MMHG

## 2018-04-21 VITALS
SYSTOLIC BLOOD PRESSURE: 149 MMHG | HEART RATE: 80 BPM | TEMPERATURE: 98.3 F | RESPIRATION RATE: 17 BRPM | DIASTOLIC BLOOD PRESSURE: 67 MMHG | OXYGEN SATURATION: 98 %

## 2018-04-21 RX ADMIN — BUPROPION HYDROCHLORIDE SCH MG: 150 TABLET, FILM COATED ORAL at 09:08

## 2018-04-21 RX ADMIN — SULFAMETHOXAZOLE AND TRIMETHOPRIM SCH TAB: 800; 160 TABLET ORAL at 09:08

## 2018-04-21 RX ADMIN — CLOTRIMAZOLE SCH APPLIC: 10 CREAM TOPICAL at 21:03

## 2018-04-21 RX ADMIN — HYDROCODONE BITARTRATE AND ACETAMINOPHEN SCH TAB: 5; 325 TABLET ORAL at 09:00

## 2018-04-21 RX ADMIN — POLYVINYL ALCOHOL SCH DROP: 14 SOLUTION/ DROPS OPHTHALMIC at 21:00

## 2018-04-21 RX ADMIN — GABAPENTIN SCH MG: 100 CAPSULE ORAL at 09:08

## 2018-04-21 RX ADMIN — HEPARIN SODIUM SCH UNITS: 10000 INJECTION, SOLUTION INTRAVENOUS; SUBCUTANEOUS at 05:19

## 2018-04-21 RX ADMIN — POLYVINYL ALCOHOL SCH DROP: 14 SOLUTION/ DROPS OPHTHALMIC at 09:10

## 2018-04-21 RX ADMIN — CYANOCOBALAMIN TAB 1000 MCG SCH MCG: 1000 TAB at 09:09

## 2018-04-21 RX ADMIN — HEPARIN SODIUM SCH UNITS: 10000 INJECTION, SOLUTION INTRAVENOUS; SUBCUTANEOUS at 21:02

## 2018-04-21 RX ADMIN — GABAPENTIN SCH MG: 100 CAPSULE ORAL at 17:45

## 2018-04-21 RX ADMIN — SULFAMETHOXAZOLE AND TRIMETHOPRIM SCH TAB: 800; 160 TABLET ORAL at 21:00

## 2018-04-21 RX ADMIN — VITAMIN D, TAB 1000IU (100/BT) SCH UNITS: 25 TAB at 09:08

## 2018-04-21 RX ADMIN — ACYCLOVIR SCH TAB: 800 TABLET ORAL at 09:08

## 2018-04-21 RX ADMIN — FAMOTIDINE SCH MG: 20 TABLET, FILM COATED ORAL at 09:09

## 2018-04-21 RX ADMIN — METOPROLOL SUCCINATE SCH MG: 25 TABLET, EXTENDED RELEASE ORAL at 09:07

## 2018-04-21 RX ADMIN — LEVOTHYROXINE SODIUM SCH MCG: 112 TABLET ORAL at 09:10

## 2018-04-21 RX ADMIN — HYDROCODONE BITARTRATE AND ACETAMINOPHEN SCH TAB: 5; 325 TABLET ORAL at 21:00

## 2018-04-21 RX ADMIN — HEPARIN SODIUM SCH UNITS: 10000 INJECTION, SOLUTION INTRAVENOUS; SUBCUTANEOUS at 15:10

## 2018-04-21 RX ADMIN — CLOTRIMAZOLE SCH APPLIC: 10 CREAM TOPICAL at 05:20

## 2018-04-21 RX ADMIN — LOSARTAN POTASSIUM SCH MG: 25 TABLET, FILM COATED ORAL at 09:07

## 2018-04-21 RX ADMIN — GABAPENTIN SCH MG: 100 CAPSULE ORAL at 15:10

## 2018-04-21 RX ADMIN — DULOXETINE SCH MG: 60 CAPSULE, DELAYED RELEASE ORAL at 21:00

## 2018-04-21 RX ADMIN — CLOTRIMAZOLE SCH APPLIC: 10 CREAM TOPICAL at 15:10

## 2018-04-21 RX ADMIN — DULOXETINE SCH MG: 60 CAPSULE, DELAYED RELEASE ORAL at 09:08
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
## 2018-04-21 NOTE — HHI.PR
Subjective


Remarks


Follow-up visit for MS, HTN, and UTI.  Patient seen and examined resting in bed 

in no acute distress.  Spoke with nursing who does not report any acute 

concerns overnight or this morning.  Patient denies any fevers, chills, nausea, 

vomiting, diarrhea, abdominal pain, shortness of breath, cough or chest pains.





Objective


Vitals





Vital Signs








  Date Time  Temp Pulse Resp B/P (MAP) Pulse Ox O2 Delivery O2 Flow Rate FiO2


 


4/21/18 05:40 98.3 78 16 171/81 (111) 96   


 


4/20/18 18:53 97.2 83 16 172/74 (106) 98   














I/O      


 


 4/20/18 4/20/18 4/20/18 4/21/18 4/21/18 4/21/18





 07:00 15:00 23:00 07:00 15:00 23:00


 


Intake Total  480 ml 1200 ml 240 ml 240 ml 


 


Output Total    650 ml  


 


Balance  480 ml 1200 ml -410 ml 240 ml 


 


      


 


Intake Oral  480 ml 1200 ml 240 ml 240 ml 


 


Output Urine Total    650 ml  


 


# Voids  3    








Result Diagram:  


4/18/18 0017                                                                   

             4/19/18 0718





Objective Remarks


GENERAL: Well-developed obese  female in no acute distress.


CARDIOVASCULAR: Regular rate and rhythm without murmurs


RESPIRATORY: Clear to auscultation. Breath sounds equal bilaterally. No wheezes


GASTROINTESTINAL: Abdomen soft, non-tender, nondistended.  Normoactive bowel 

sounds.  Boss catheter in place.


MUSCULOSKELETAL: Extremities without edema.  Bilateral lower extremity weakness 

muscle strength is about 3 out of 5


NEUROLOGICAL: Awake and alert.  Normal speech.


Mood and behavior are appropriate





A/P


Problem List:  


(1) Multiple sclerosis


ICD Code:  G35 - Multiple sclerosis


Assessment and Plan


Possible suicidal gesture with anxiety and depression will defer to psychiatry





Multiple sclerosis, chronic


   - Continue on medications for muscle spasms and continue physical therapy 

and Occupational Therapy continue on Zanaflex





Hypertension, uncontrolled


   -On amlodipine, losartan, metoprolol 25 mg daily BP last night and this 

morning elevated.  Patient did take BP medications, VS and adjust medications 

accordingly.





GERD- famotidine 20 mg daily





Complicated UTI-gram-negative shayla


   - suspect due to neurogenic bladder from the multiple sclerosis. UA is 

growing E. coli.  Boss catheter was exchanged 4/20, new UA growing gram-

negative rods


   -Patient currently on Bactrim, no reports of Boss leakage or patient 

complaints.


   -Follow urine culture sensitivity and adjust accordingly.





Hypothyroidism 


   - TSH and free T4 wnl, continue on Synthroid





DVT prophylaxis with subcu heparin (patient noted to be refusing)





Discussed with patient and nurse.











Steffanie Garcia Apr 21, 2018 09:11

## 2018-04-22 VITALS
DIASTOLIC BLOOD PRESSURE: 67 MMHG | OXYGEN SATURATION: 94 % | HEART RATE: 83 BPM | SYSTOLIC BLOOD PRESSURE: 147 MMHG | RESPIRATION RATE: 17 BRPM | TEMPERATURE: 98.2 F

## 2018-04-22 LAB
ALBUMIN SERPL-MCNC: 2.8 GM/DL (ref 3.4–5)
ALP SERPL-CCNC: 101 U/L (ref 45–117)
ALT SERPL-CCNC: 23 U/L (ref 10–53)
AST SERPL-CCNC: 18 U/L (ref 15–37)
BASOPHILS # BLD AUTO: 0.1 TH/MM3 (ref 0–0.2)
BASOPHILS NFR BLD: 0.8 % (ref 0–2)
BILIRUB SERPL-MCNC: 0.6 MG/DL (ref 0.2–1)
BUN SERPL-MCNC: 25 MG/DL (ref 7–18)
CALCIUM SERPL-MCNC: 9.4 MG/DL (ref 8.5–10.1)
CHLORIDE SERPL-SCNC: 108 MEQ/L (ref 98–107)
CREAT SERPL-MCNC: 1.32 MG/DL (ref 0.5–1)
EOSINOPHIL # BLD: 0.3 TH/MM3 (ref 0–0.4)
EOSINOPHIL NFR BLD: 2.6 % (ref 0–4)
ERYTHROCYTE [DISTWIDTH] IN BLOOD BY AUTOMATED COUNT: 13.6 % (ref 11.6–17.2)
GFR SERPLBLD BASED ON 1.73 SQ M-ARVRAT: 41 ML/MIN (ref 89–?)
GLUCOSE SERPL-MCNC: 119 MG/DL (ref 74–106)
HCO3 BLD-SCNC: 25 MEQ/L (ref 21–32)
HCT VFR BLD CALC: 45 % (ref 35–46)
HGB BLD-MCNC: 15.5 GM/DL (ref 11.6–15.3)
LYMPHOCYTES # BLD AUTO: 2.4 TH/MM3 (ref 1–4.8)
LYMPHOCYTES NFR BLD AUTO: 24.7 % (ref 9–44)
MAGNESIUM SERPL-MCNC: 2.1 MG/DL (ref 1.5–2.5)
MCH RBC QN AUTO: 32.5 PG (ref 27–34)
MCHC RBC AUTO-ENTMCNC: 34.5 % (ref 32–36)
MCV RBC AUTO: 94.4 FL (ref 80–100)
MONOCYTE #: 1.2 TH/MM3 (ref 0–0.9)
MONOCYTES NFR BLD: 12.5 % (ref 0–8)
NEUTROPHILS # BLD AUTO: 5.9 TH/MM3 (ref 1.8–7.7)
NEUTROPHILS NFR BLD AUTO: 59.4 % (ref 16–70)
PHOSPHATE SERPL-MCNC: 2.7 MG/DL (ref 2.5–4.9)
PLATELET # BLD: 205 TH/MM3 (ref 150–450)
PMV BLD AUTO: 10 FL (ref 7–11)
PROT SERPL-MCNC: 7.3 GM/DL (ref 6.4–8.2)
RBC # BLD AUTO: 4.77 MIL/MM3 (ref 4–5.3)
SODIUM SERPL-SCNC: 142 MEQ/L (ref 136–145)
T4 FREE SERPL-MCNC: 1.4 NG/DL (ref 0.76–1.46)
WBC # BLD AUTO: 9.9 TH/MM3 (ref 4–11)

## 2018-04-22 RX ADMIN — BUPROPION HYDROCHLORIDE SCH MG: 150 TABLET, FILM COATED ORAL at 08:54

## 2018-04-22 RX ADMIN — GABAPENTIN SCH MG: 100 CAPSULE ORAL at 08:55

## 2018-04-22 RX ADMIN — LOSARTAN POTASSIUM SCH MG: 25 TABLET, FILM COATED ORAL at 08:53

## 2018-04-22 RX ADMIN — CLOTRIMAZOLE SCH APPLIC: 10 CREAM TOPICAL at 20:52

## 2018-04-22 RX ADMIN — HYDROCODONE BITARTRATE AND ACETAMINOPHEN SCH TAB: 5; 325 TABLET ORAL at 08:56

## 2018-04-22 RX ADMIN — CLOTRIMAZOLE SCH APPLIC: 10 CREAM TOPICAL at 05:47

## 2018-04-22 RX ADMIN — HEPARIN SODIUM SCH UNITS: 10000 INJECTION, SOLUTION INTRAVENOUS; SUBCUTANEOUS at 05:47

## 2018-04-22 RX ADMIN — CYANOCOBALAMIN TAB 1000 MCG SCH MCG: 1000 TAB at 08:55

## 2018-04-22 RX ADMIN — METOPROLOL SUCCINATE SCH MG: 25 TABLET, EXTENDED RELEASE ORAL at 08:54

## 2018-04-22 RX ADMIN — HYDROCODONE BITARTRATE AND ACETAMINOPHEN SCH TAB: 5; 325 TABLET ORAL at 20:49

## 2018-04-22 RX ADMIN — GABAPENTIN SCH MG: 100 CAPSULE ORAL at 18:00

## 2018-04-22 RX ADMIN — POLYVINYL ALCOHOL SCH DROP: 14 SOLUTION/ DROPS OPHTHALMIC at 09:00

## 2018-04-22 RX ADMIN — LEVOTHYROXINE SODIUM SCH MCG: 112 TABLET ORAL at 05:47

## 2018-04-22 RX ADMIN — DULOXETINE SCH MG: 60 CAPSULE, DELAYED RELEASE ORAL at 08:53

## 2018-04-22 RX ADMIN — SULFAMETHOXAZOLE AND TRIMETHOPRIM SCH TAB: 800; 160 TABLET ORAL at 08:54

## 2018-04-22 RX ADMIN — CLOTRIMAZOLE SCH APPLIC: 10 CREAM TOPICAL at 14:00

## 2018-04-22 RX ADMIN — VITAMIN D, TAB 1000IU (100/BT) SCH UNITS: 25 TAB at 08:56

## 2018-04-22 RX ADMIN — DULOXETINE SCH MG: 60 CAPSULE, DELAYED RELEASE ORAL at 20:49

## 2018-04-22 RX ADMIN — HEPARIN SODIUM SCH UNITS: 10000 INJECTION, SOLUTION INTRAVENOUS; SUBCUTANEOUS at 14:28

## 2018-04-22 RX ADMIN — ACYCLOVIR SCH TAB: 800 TABLET ORAL at 08:54

## 2018-04-22 RX ADMIN — GABAPENTIN SCH MG: 100 CAPSULE ORAL at 14:28

## 2018-04-22 RX ADMIN — SODIUM CHLORIDE SCH MLS/HR: 900 INJECTION INTRAVENOUS at 18:00

## 2018-04-22 RX ADMIN — HEPARIN SODIUM SCH UNITS: 10000 INJECTION, SOLUTION INTRAVENOUS; SUBCUTANEOUS at 20:51

## 2018-04-22 RX ADMIN — SODIUM CHLORIDE SCH MLS/HR: 900 INJECTION INTRAVENOUS at 21:00

## 2018-04-22 RX ADMIN — FAMOTIDINE SCH MG: 20 TABLET, FILM COATED ORAL at 08:59

## 2018-04-22 RX ADMIN — POLYVINYL ALCOHOL SCH DROP: 14 SOLUTION/ DROPS OPHTHALMIC at 20:35

## 2018-04-22 RX ADMIN — GABAPENTIN SCH MG: 100 CAPSULE ORAL at 18:06

## 2018-04-22 NOTE — HHI.PR
Subjective


Remarks


Follow-up visit for MS, HTN, and UTI.  Patient seen and examined today in bed, 

she is very tearful, roommate next-door with sitter and being disruptive.  

Patient denies any fevers, chills, nausea, vomiting, diarrhea or headaches.  

She is tearful and repeatedly says "I do not want it, I do not want it, I do 

not want".





Objective


Vitals





Vital Signs








  Date Time  Temp Pulse Resp B/P (MAP) Pulse Ox O2 Delivery O2 Flow Rate FiO2


 


4/22/18 05:54 98.2 83 17 147/67 (93) 94   


 


4/21/18 15:30 98.3 80 17 149/67 (94) 98   














I/O      


 


 4/21/18 4/21/18 4/21/18 4/22/18 4/22/18 4/22/18





 07:00 15:00 23:00 07:00 15:00 23:00


 


Intake Total 240 ml 720 ml 600 ml 120 ml  


 


Output Total 650 ml 1000 ml 150 ml   


 


Balance -410 ml -280 ml 450 ml 120 ml  


 


      


 


Intake Oral 240 ml 720 ml 600 ml 120 ml  


 


Output Urine Total 650 ml 1000 ml 150 ml   








Result Diagram:  


4/18/18 0017                                                                   

             4/19/18 0718





Objective Remarks


GENERAL: Well-developed obese  female tearful.


CARDIOVASCULAR: Regular rate and rhythm without murmurs


RESPIRATORY: Clear to auscultation. Breath sounds equal bilaterally. No wheezes


GASTROINTESTINAL: Abdomen soft, non-tender, nondistended.  Normoactive bowel 

sounds.  Boss catheter in place.


MUSCULOSKELETAL: Extremities without edema.  Bilateral lower extremity weakness 

muscle strength is about 3 out of 5


NEUROLOGICAL: Awake and alert.  Normal speech.





A/P


Problem List:  


(1) Multiple sclerosis


ICD Code:  G35 - Multiple sclerosis


Assessment and Plan


Possible suicidal gesture with anxiety and depression will defer to psychiatry





Multiple sclerosis, chronic


   - Continue on medications for muscle spasms and continue physical therapy 

and Occupational Therapy continue on Zanaflex





Hypertension, controlled


   -On amlodipine, losartan, metoprolol 25 mg daily, blood pressure is better 

140s over 60s


   -Continue monitoring and adjusting accordingly.





GERD- famotidine 20 mg daily





Complicated UTI-gram-negative shayla


   - suspect due to neurogenic bladder from the multiple sclerosis. UA is 

growing E. coli.  Boss catheter was exchanged 4/20, new UA growing gram-

negative rods


   -Patient currently on Bactrim, no reports of Boss leakage or patient 

complaints.


   -Follow urine culture sensitivity and adjust accordingly.





Hypothyroidism 


   - TSH and free T4 wnl, continue on Synthroid (of note patient has been 

refusing)





DVT prophylaxis with subcu heparin (patient noted to be refusing)





Discussed with patient and nurse.











Steffanie Garcia Apr 22, 2018 08:16

## 2018-04-22 NOTE — HHI.PYPN
Subjective


Remarks


Patient was seen and case discussed with nursing.  Today the patient opened up 

about her various hallucinations.  Patient says that she hears the voice of her 

  talking to her every single day.  She later makes a bizarre 

statement that she was still hears voices while he was alive.  She also states 

converses with her dead brother and a daily basis as well.  She sees nothing 

wrong with this and enjoys conversations.  Affect remains labile where she was 

tearful because of puppies.  Denies suicidal or homicidal ideation intent or 

plan





Mental Status Examination


Appearance:  Disheveled


Consciousness:  Alert


Orientation:  Person, Date/Time (year only)


Motor Activity:  Other (Bedridden)


Speech:  Unremarkable, Other (Unable to assess)


Language:  Adequate


Fund of Knowledge:  Inadequate


Attention and Concentration:  Inadequate


Memory:  Impaired


Mood:  Irritable (Slightly), Other


Affect:  Other (Tearful throughout interview)


Thought Process & Associations:  Other ([place])


Thought Content:  Hallucinations, Delusional


Hallucination Type:  Auditory ( and brother), Other


Delusion Type:  Other


Suicidal Ideation:  No


Suicidal Plan:  No (Unable to assess)


Suicidal Intention:  No (Unable to assess )


Homicidal Ideation:  No (Unable to assess)


Homicidal Plan:  No (Unable to assess)


Homicidal Intention:  No (Unable to assess)


Insight:  Poor


Judgment:  Poor





Results


Labs











Test


  18


08:44


 


White Blood Count 9.9 TH/MM3 


 


Red Blood Count 4.77 MIL/MM3 


 


Hemoglobin 15.5 GM/DL 


 


Hematocrit 45.0 % 


 


Mean Corpuscular Volume 94.4 FL 


 


Mean Corpuscular Hemoglobin 32.5 PG 


 


Mean Corpuscular Hemoglobin


Concent 34.5 % 


 


 


Red Cell Distribution Width 13.6 % 


 


Platelet Count 205 TH/MM3 


 


Mean Platelet Volume 10.0 FL 


 


Neutrophils (%) (Auto) 59.4 % 


 


Lymphocytes (%) (Auto) 24.7 % 


 


Monocytes (%) (Auto) 12.5 % 


 


Eosinophils (%) (Auto) 2.6 % 


 


Basophils (%) (Auto) 0.8 % 


 


Neutrophils # (Auto) 5.9 TH/MM3 


 


Lymphocytes # (Auto) 2.4 TH/MM3 


 


Monocytes # (Auto) 1.2 TH/MM3 


 


Eosinophils # (Auto) 0.3 TH/MM3 


 


Basophils # (Auto) 0.1 TH/MM3 


 


CBC Comment DIFF FINAL 


 


Differential Comment  


 


Blood Urea Nitrogen 25 MG/DL 


 


Creatinine 1.32 MG/DL 


 


Random Glucose 119 MG/DL 


 


Total Protein 7.3 GM/DL 


 


Albumin 2.8 GM/DL 


 


Calcium Level 9.4 MG/DL 


 


Phosphorus Level 2.7 MG/DL 


 


Magnesium Level 2.1 MG/DL 


 


Alkaline Phosphatase 101 U/L 


 


Aspartate Amino Transf


(AST/SGOT) 18 U/L 


 


 


Alanine Aminotransferase


(ALT/SGPT) 23 U/L 


 


 


Total Bilirubin 0.6 MG/DL 


 


Sodium Level 142 MEQ/L 


 


Potassium Level 4.3 MEQ/L 


 


Chloride Level 108 MEQ/L 


 


Carbon Dioxide Level 25.0 MEQ/L 


 


Anion Gap 9 MEQ/L 


 


Estimat Glomerular Filtration


Rate 41 ML/MIN 


 


 


Free Thyroxine 1.40 NG/DL 


 


Thyroid Stimulating Hormone


3rd Gen 1.900 uIU/ML 


 














 Date/Time


Source Procedure


Growth Status


 


 


 18 09:53


Urine Catheterized Urine Urine Culture - Preliminary


Proteus Mirabilis Resulted








Vitals/IOs





Vital Signs








  Date Time  Temp Pulse Resp B/P (MAP) Pulse Ox O2 Delivery O2 Flow Rate FiO2


 


18 05:54 98.2 83 17 147/67 (93) 94   














Intake and Output   


 


 18





 08:00 16:00 00:00


 


Intake Total 120 ml 240 ml 


 


Balance 120 ml 240 ml 











Assessment & Plan


Problem List:  


(1) Unspecified psychosis


ICD Codes:  F29 - Unspecified psychosis not due to a substance or known 

physiological condition


Status:  Acute


Assessment & Plan


Continue current treatment plan


Justification for Cont. Inpt.


Patient would decompensate in a less restrictive setting











Joseph Soni DO 2018 14:16

## 2018-04-23 VITALS
SYSTOLIC BLOOD PRESSURE: 137 MMHG | RESPIRATION RATE: 17 BRPM | TEMPERATURE: 98.2 F | OXYGEN SATURATION: 95 % | DIASTOLIC BLOOD PRESSURE: 67 MMHG | HEART RATE: 70 BPM

## 2018-04-23 VITALS
DIASTOLIC BLOOD PRESSURE: 45 MMHG | OXYGEN SATURATION: 97 % | SYSTOLIC BLOOD PRESSURE: 91 MMHG | HEART RATE: 65 BPM | RESPIRATION RATE: 18 BRPM | TEMPERATURE: 97.7 F

## 2018-04-23 LAB
BUN SERPL-MCNC: 32 MG/DL (ref 7–18)
CALCIUM SERPL-MCNC: 9.4 MG/DL (ref 8.5–10.1)
CHLORIDE SERPL-SCNC: 109 MEQ/L (ref 98–107)
CREAT SERPL-MCNC: 1.59 MG/DL (ref 0.5–1)
GFR SERPLBLD BASED ON 1.73 SQ M-ARVRAT: 33 ML/MIN (ref 89–?)
GLUCOSE SERPL-MCNC: 110 MG/DL (ref 74–106)
HBA1C MFR BLD: 5.8 % (ref 4.3–6)
HCO3 BLD-SCNC: 24.3 MEQ/L (ref 21–32)
SODIUM SERPL-SCNC: 142 MEQ/L (ref 136–145)

## 2018-04-23 RX ADMIN — POLYVINYL ALCOHOL SCH DROP: 14 SOLUTION/ DROPS OPHTHALMIC at 10:19

## 2018-04-23 RX ADMIN — VITAMIN D, TAB 1000IU (100/BT) SCH UNITS: 25 TAB at 10:15

## 2018-04-23 RX ADMIN — METOPROLOL SUCCINATE SCH MG: 25 TABLET, EXTENDED RELEASE ORAL at 10:14

## 2018-04-23 RX ADMIN — CYANOCOBALAMIN TAB 1000 MCG SCH MCG: 1000 TAB at 10:15

## 2018-04-23 RX ADMIN — DULOXETINE SCH MG: 60 CAPSULE, DELAYED RELEASE ORAL at 21:06

## 2018-04-23 RX ADMIN — HYDROCODONE BITARTRATE AND ACETAMINOPHEN SCH TAB: 5; 325 TABLET ORAL at 10:13

## 2018-04-23 RX ADMIN — BUPROPION HYDROCHLORIDE SCH MG: 150 TABLET, FILM COATED ORAL at 10:17

## 2018-04-23 RX ADMIN — FAMOTIDINE SCH MG: 20 TABLET, FILM COATED ORAL at 10:14

## 2018-04-23 RX ADMIN — POLYVINYL ALCOHOL SCH DROP: 14 SOLUTION/ DROPS OPHTHALMIC at 21:00

## 2018-04-23 RX ADMIN — PHENYTOIN SODIUM SCH MLS/HR: 50 INJECTION INTRAMUSCULAR; INTRAVENOUS at 15:45

## 2018-04-23 RX ADMIN — CLOTRIMAZOLE SCH APPLIC: 10 CREAM TOPICAL at 14:26

## 2018-04-23 RX ADMIN — LEVOTHYROXINE SODIUM SCH MCG: 112 TABLET ORAL at 05:56

## 2018-04-23 RX ADMIN — HEPARIN SODIUM SCH UNITS: 10000 INJECTION, SOLUTION INTRAVENOUS; SUBCUTANEOUS at 14:26

## 2018-04-23 RX ADMIN — GABAPENTIN SCH MG: 100 CAPSULE ORAL at 13:00

## 2018-04-23 RX ADMIN — CLOTRIMAZOLE SCH APPLIC: 10 CREAM TOPICAL at 21:07

## 2018-04-23 RX ADMIN — HEPARIN SODIUM SCH UNITS: 10000 INJECTION, SOLUTION INTRAVENOUS; SUBCUTANEOUS at 05:56

## 2018-04-23 RX ADMIN — HEPARIN SODIUM SCH UNITS: 10000 INJECTION, SOLUTION INTRAVENOUS; SUBCUTANEOUS at 21:06

## 2018-04-23 RX ADMIN — HYDROCODONE BITARTRATE AND ACETAMINOPHEN SCH TAB: 5; 325 TABLET ORAL at 21:07

## 2018-04-23 RX ADMIN — SODIUM CHLORIDE SCH MLS/HR: 900 INJECTION INTRAVENOUS at 18:42

## 2018-04-23 RX ADMIN — DULOXETINE SCH MG: 60 CAPSULE, DELAYED RELEASE ORAL at 10:12

## 2018-04-23 RX ADMIN — GABAPENTIN SCH MG: 100 CAPSULE ORAL at 18:42

## 2018-04-23 RX ADMIN — ACYCLOVIR SCH TAB: 800 TABLET ORAL at 10:14

## 2018-04-23 RX ADMIN — GABAPENTIN SCH MG: 100 CAPSULE ORAL at 10:12

## 2018-04-23 RX ADMIN — CLOTRIMAZOLE SCH APPLIC: 10 CREAM TOPICAL at 05:56

## 2018-04-23 RX ADMIN — LOSARTAN POTASSIUM SCH MG: 25 TABLET, FILM COATED ORAL at 10:12

## 2018-04-23 NOTE — HHI.PR
Subjective


Remarks


Follow-up visit for MS, HTN, and UTI.  Patient is seen and examined in bed, she 

is awake, and alert speaking to sitter who is in the room.  Patient denies any 

fevers, chills, nausea, vomiting, diarrhea, shortness of breath or chest pain.  

She reports some choking and reports leg pain.  When I check Boss tubing 

patient screams out in pain and states "do not touch my legs".  Discussed with 

patient that I am evaluating the color of the urine, discussed with her 

findings of UTI in changing antibiotic.  Patient goes from being upset over the 

results to then turning over to me and stated "but I want to to help me!".  

Spoke with nurse reports patient has been having mood swings that are still 

ongoing.  Was being assisted with meals this morning and was spitting out food.

  No other acute concerns.





Objective


Vitals





Vital Signs








  Date Time  Temp Pulse Resp B/P (MAP) Pulse Ox O2 Delivery O2 Flow Rate FiO2


 


4/23/18 06:18 98.2 70 17 137/67 (90) 95   














I/O      


 


 4/22/18 4/22/18 4/22/18 4/23/18 4/23/18 4/23/18





 07:00 15:00 23:00 07:00 15:00 23:00


 


Intake Total 120 ml 240 ml 170 ml 60 ml  


 


Output Total    300 ml  


 


Balance 120 ml 240 ml 170 ml -240 ml  


 


      


 


Intake Oral 120 ml 240 ml 170 ml 60 ml  


 


Output Urine Total    300 ml  








Result Diagram:  


4/22/18 0844                                                                   

             4/23/18 0620





Objective Remarks


GENERAL: Well-developed obese  female, mood swings and behavioral 

changes moment to moment.


CARDIOVASCULAR: Regular rate and rhythm without murmurs


RESPIRATORY: Clear to auscultation. Breath sounds equal bilaterally. No wheezes


GASTROINTESTINAL: Abdomen soft, non-tender, nondistended.  Normoactive bowel 

sounds.  Boss catheter in place draining cloudy urine.


MUSCULOSKELETAL: Extremities without edema.  Bilateral lower extremity weakness 

muscle strength is about 3 out of 5


NEUROLOGICAL: Awake and alert.  Normal speech.





A/P


Problem List:  


(1) Multiple sclerosis


ICD Code:  G35 - Multiple sclerosis


Assessment and Plan


Possible suicidal gesture with anxiety and depression will defer to psychiatry





Multiple sclerosis, chronic


   - Continue on medications for muscle spasms and continue physical therapy 

and Occupational Therapy continue on Zanaflex


   -Complaints of leg pain today however patient refused last dose of 

gabapentin and has also been refusing Zanaflex on and off in the past





Hypertension, controlled


   -On amlodipine, losartan, metoprolol 25 mg daily, BP this morning 137/67


   -Continue monitoring and adjusting accordingly.





GERD- famotidine 20 mg daily





Complicated UTI-Proteus Mirabilis


   - suspect due to neurogenic bladder from the multiple sclerosis.  Initial UA 

grew E. coli.  Boss catheter was exchanged 4/20, new UA growing Proteus 

mirabilis


   -Bactrim was discontinued and started on IV ceftriaxone





KATIE


   - No reported renal disease per H&P, patient is a poor historian, no family 

at bedside this morning


   - Creatinine on admission was 1.42, BUN 24. Later improved to 1.25


   - Labs today with BUN 32, creatinine 1.59 and GFR 33


   - Received 4 doses of PO Bactrim for UTI, now on IV Ceftriazone


   - Labs consistent with dehydration


   - Will start NS at 84ml/hr, for hydration


   - Avoid nephrotoxins, check BMP in the AM


   - If not improved consider renal US or nephro consult if worse





Hypothyroidism 


   - TSH and free T4 wnl, continue on Synthroid (of note patient has been 

refused this in the past)





DVT prophylaxis with subcu heparin (patient has refused several doses in the 

past)





Discussed with patient, Dr. Stevens and nurse.











Steffanie Garcia Apr 23, 2018 08:23

## 2018-04-23 NOTE — HHI.PYPN
Subjective


Remarks


Patient is here for follow, chart reviewed.  Discussion nursing staff reported 

the patient noted to be labile, there to be internally preoccupied and talking 

to self, endorsing auditory hallucinations, paranoid but is cooperative with 

staff.  Patient was found lying hospital bed noted B, cooperative.  Patient 

states that it was "terrible over the weekend", she states that she is having 

auditory hallucinations every 10 minutes telling her that "they are going to 

rape me", the patient noted to be talking to self prior to interview as well as 

after, disorganized at times.  Patient alert and oriented only to person stated 

that she is in [place] and is the year 2020.





Review of Systems


Except as stated in HPI:  all other systems reviewed are Neg





Mental Status Examination


Appearance:  Disheveled


Consciousness:  Alert


Orientation:  Person


Motor Activity:  Other (Bedridden)


Speech:  Unremarkable, Other (Unable to assess)


Language:  Adequate


Fund of Knowledge:  Inadequate


Attention and Concentration:  Inadequate


Memory:  Impaired


Mood:  Other ("Terrible")


Affect:  Labile


Thought Process & Associations:  Disorganized


Thought Content:  Hallucinations, Delusional


Hallucination Type:  Auditory ("They are going to rape me")


Delusion Type:  Paranoid


Suicidal Ideation:  No


Suicidal Plan:  No


Suicidal Intention:  No


Homicidal Ideation:  No


Homicidal Plan:  No


Homicidal Intention:  No


Insight:  Poor


Judgment:  Poor





Results


Labs


Labs reviewed








Test


  4/23/18


06:20


 


Blood Urea Nitrogen 32 MG/DL 


 


Creatinine 1.59 MG/DL 


 


Random Glucose 110 MG/DL 


 


Calcium Level 9.4 MG/DL 


 


Sodium Level 142 MEQ/L 


 


Potassium Level 4.5 MEQ/L 


 


Chloride Level 109 MEQ/L 


 


Carbon Dioxide Level 24.3 MEQ/L 


 


Anion Gap 9 MEQ/L 


 


Estimat Glomerular Filtration


Rate 33 ML/MIN 


 














 Date/Time


Source Procedure


Growth Status


 


 


 4/20/18 09:53


Urine Catheterized Urine Urine Culture - Preliminary


Proteus Mirabilis Resulted








Vitals/IOs





Vital Signs








  Date Time  Temp Pulse Resp B/P (MAP) Pulse Ox O2 Delivery O2 Flow Rate FiO2


 


4/23/18 06:18 98.2 70 17 137/67 (90) 95   














Intake and Output   


 


 4/23/18 4/23/18 4/24/18





 08:00 16:00 00:00


 


Intake Total 300 ml 240 ml 


 


Output Total 300 ml  


 


Balance 0 ml 240 ml 











Assessment & Plan


Problem List:  


(1) Unspecified psychosis


ICD Codes:  F29 - Unspecified psychosis not due to a substance or known 

physiological condition


Status:  Acute


Assessment & Plan


Patient this time noted to be disorganized with auditory hallucinations, noted 

to be talking to self responding to internal stimuli.  Patient since admission 

has been started on treatment for UTI with consideration the patient's recent 

confusion was secondary to delirium from UTI per patient since having been 

started on antibiotic treatment continues to be noted to be disorganized along 

with perceptual services which patient will be started on risperidone 0.5 mg 

p.o. twice daily for psychosis.  Writer was able to contact patient's brother 

Morales Curry, who is patient's healthcare surrogate and consented to starting 

this medication.  Continue rest of medications.  Continue recommendations as 

per prior medical team.  Continue monitor mood and behavior.  Discharge 

planning in progress.


Justification for Cont. Inpt.


At risk for further decompensation if at lower level of care











Wilbur Stevens MD Apr 23, 2018 16:49

## 2018-04-24 ENCOUNTER — HOSPITAL ENCOUNTER (INPATIENT)
Dept: HOSPITAL 17 - HIMN | Age: 63
LOS: 2 days | Discharge: PSYCHIATRIC OR SUBSTANCE USE FACILITY | DRG: 897 | End: 2018-04-26
Attending: HOSPITALIST | Admitting: HOSPITALIST
Payer: MEDICARE

## 2018-04-24 VITALS
SYSTOLIC BLOOD PRESSURE: 166 MMHG | OXYGEN SATURATION: 97 % | TEMPERATURE: 97.9 F | HEART RATE: 96 BPM | RESPIRATION RATE: 11 BRPM | DIASTOLIC BLOOD PRESSURE: 103 MMHG

## 2018-04-24 VITALS
SYSTOLIC BLOOD PRESSURE: 152 MMHG | HEART RATE: 81 BPM | TEMPERATURE: 97.4 F | OXYGEN SATURATION: 93 % | DIASTOLIC BLOOD PRESSURE: 64 MMHG | RESPIRATION RATE: 20 BRPM

## 2018-04-24 VITALS
OXYGEN SATURATION: 94 % | SYSTOLIC BLOOD PRESSURE: 93 MMHG | HEART RATE: 58 BPM | TEMPERATURE: 97.4 F | RESPIRATION RATE: 17 BRPM | DIASTOLIC BLOOD PRESSURE: 52 MMHG

## 2018-04-24 VITALS
DIASTOLIC BLOOD PRESSURE: 63 MMHG | SYSTOLIC BLOOD PRESSURE: 99 MMHG | TEMPERATURE: 97.6 F | OXYGEN SATURATION: 98 % | RESPIRATION RATE: 15 BRPM | HEART RATE: 69 BPM

## 2018-04-24 VITALS — HEART RATE: 93 BPM

## 2018-04-24 VITALS — HEART RATE: 80 BPM

## 2018-04-24 VITALS — BODY MASS INDEX: 42.87 KG/M2 | HEIGHT: 64 IN | WEIGHT: 251.11 LBS

## 2018-04-24 DIAGNOSIS — G35: ICD-10-CM

## 2018-04-24 DIAGNOSIS — E86.0: ICD-10-CM

## 2018-04-24 DIAGNOSIS — R32: ICD-10-CM

## 2018-04-24 DIAGNOSIS — F19.239: Primary | ICD-10-CM

## 2018-04-24 DIAGNOSIS — N17.9: ICD-10-CM

## 2018-04-24 DIAGNOSIS — R25.1: ICD-10-CM

## 2018-04-24 DIAGNOSIS — F32.9: ICD-10-CM

## 2018-04-24 DIAGNOSIS — Z99.3: ICD-10-CM

## 2018-04-24 DIAGNOSIS — I10: ICD-10-CM

## 2018-04-24 DIAGNOSIS — E03.9: ICD-10-CM

## 2018-04-24 DIAGNOSIS — G40.89: ICD-10-CM

## 2018-04-24 DIAGNOSIS — F29: ICD-10-CM

## 2018-04-24 DIAGNOSIS — N39.0: ICD-10-CM

## 2018-04-24 DIAGNOSIS — G89.29: ICD-10-CM

## 2018-04-24 DIAGNOSIS — K21.9: ICD-10-CM

## 2018-04-24 DIAGNOSIS — E66.9: ICD-10-CM

## 2018-04-24 LAB
ALBUMIN SERPL-MCNC: 2.6 GM/DL (ref 3.4–5)
ALP SERPL-CCNC: 96 U/L (ref 45–117)
ALT SERPL-CCNC: 23 U/L (ref 10–53)
AST SERPL-CCNC: 21 U/L (ref 15–37)
BASOPHILS # BLD AUTO: 0.1 TH/MM3 (ref 0–0.2)
BASOPHILS NFR BLD: 1.1 % (ref 0–2)
BILIRUB SERPL-MCNC: 0.3 MG/DL (ref 0.2–1)
BUN SERPL-MCNC: 35 MG/DL (ref 7–18)
BUN SERPL-MCNC: 37 MG/DL (ref 7–18)
CALCIUM SERPL-MCNC: 9.1 MG/DL (ref 8.5–10.1)
CALCIUM SERPL-MCNC: 9.6 MG/DL (ref 8.5–10.1)
CHLORIDE SERPL-SCNC: 106 MEQ/L (ref 98–107)
CHLORIDE SERPL-SCNC: 106 MEQ/L (ref 98–107)
CREAT SERPL-MCNC: 1.62 MG/DL (ref 0.5–1)
CREAT SERPL-MCNC: 1.78 MG/DL (ref 0.5–1)
EOSINOPHIL # BLD: 0.2 TH/MM3 (ref 0–0.4)
EOSINOPHIL NFR BLD: 2 % (ref 0–4)
ERYTHROCYTE [DISTWIDTH] IN BLOOD BY AUTOMATED COUNT: 13.6 % (ref 11.6–17.2)
GFR SERPLBLD BASED ON 1.73 SQ M-ARVRAT: 29 ML/MIN (ref 89–?)
GFR SERPLBLD BASED ON 1.73 SQ M-ARVRAT: 32 ML/MIN (ref 89–?)
GLUCOSE SERPL-MCNC: 144 MG/DL (ref 74–106)
GLUCOSE SERPL-MCNC: 148 MG/DL (ref 74–106)
HCO3 BLD-SCNC: 24.8 MEQ/L (ref 21–32)
HCO3 BLD-SCNC: 25.8 MEQ/L (ref 21–32)
HCT VFR BLD CALC: 39.9 % (ref 35–46)
HGB BLD-MCNC: 13.7 GM/DL (ref 11.6–15.3)
LYMPHOCYTES # BLD AUTO: 1.7 TH/MM3 (ref 1–4.8)
LYMPHOCYTES NFR BLD AUTO: 19.6 % (ref 9–44)
MCH RBC QN AUTO: 32.8 PG (ref 27–34)
MCHC RBC AUTO-ENTMCNC: 34.4 % (ref 32–36)
MCV RBC AUTO: 95.2 FL (ref 80–100)
MONOCYTE #: 0.6 TH/MM3 (ref 0–0.9)
MONOCYTES NFR BLD: 7.4 % (ref 0–8)
NEUTROPHILS # BLD AUTO: 6 TH/MM3 (ref 1.8–7.7)
NEUTROPHILS NFR BLD AUTO: 69.9 % (ref 16–70)
PLATELET # BLD: 187 TH/MM3 (ref 150–450)
PMV BLD AUTO: 10 FL (ref 7–11)
PROT SERPL-MCNC: 6.9 GM/DL (ref 6.4–8.2)
RBC # BLD AUTO: 4.19 MIL/MM3 (ref 4–5.3)
SODIUM SERPL-SCNC: 140 MEQ/L (ref 136–145)
SODIUM SERPL-SCNC: 140 MEQ/L (ref 136–145)
WBC # BLD AUTO: 8.6 TH/MM3 (ref 4–11)

## 2018-04-24 PROCEDURE — 80053 COMPREHEN METABOLIC PANEL: CPT

## 2018-04-24 PROCEDURE — 70450 CT HEAD/BRAIN W/O DYE: CPT

## 2018-04-24 PROCEDURE — 70551 MRI BRAIN STEM W/O DYE: CPT

## 2018-04-24 PROCEDURE — 36600 WITHDRAWAL OF ARTERIAL BLOOD: CPT

## 2018-04-24 PROCEDURE — 82805 BLOOD GASES W/O2 SATURATION: CPT

## 2018-04-24 PROCEDURE — 85025 COMPLETE CBC W/AUTO DIFF WBC: CPT

## 2018-04-24 PROCEDURE — 95819 EEG AWAKE AND ASLEEP: CPT

## 2018-04-24 PROCEDURE — 72141 MRI NECK SPINE W/O DYE: CPT

## 2018-04-24 PROCEDURE — 80048 BASIC METABOLIC PNL TOTAL CA: CPT

## 2018-04-24 PROCEDURE — 71045 X-RAY EXAM CHEST 1 VIEW: CPT

## 2018-04-24 PROCEDURE — 76937 US GUIDE VASCULAR ACCESS: CPT

## 2018-04-24 PROCEDURE — 82550 ASSAY OF CK (CPK): CPT

## 2018-04-24 RX ADMIN — POLYVINYL ALCOHOL SCH DROP: 14 SOLUTION/ DROPS OPHTHALMIC at 21:31

## 2018-04-24 RX ADMIN — CLOTRIMAZOLE SCH APPLIC: 10 CREAM TOPICAL at 05:50

## 2018-04-24 RX ADMIN — CLOTRIMAZOLE SCH APPLIC: 10 CREAM TOPICAL at 17:49

## 2018-04-24 RX ADMIN — MAGNESIUM SULFATE HEPTAHYDRATE SCH MLS/HR: 500 INJECTION, SOLUTION INTRAMUSCULAR; INTRAVENOUS at 16:00

## 2018-04-24 RX ADMIN — HEPARIN SODIUM SCH UNITS: 10000 INJECTION, SOLUTION INTRAVENOUS; SUBCUTANEOUS at 14:55

## 2018-04-24 RX ADMIN — CLOTRIMAZOLE SCH APPLIC: 10 CREAM TOPICAL at 21:30

## 2018-04-24 RX ADMIN — HEPARIN SODIUM SCH UNITS: 10000 INJECTION, SOLUTION INTRAVENOUS; SUBCUTANEOUS at 05:50

## 2018-04-24 RX ADMIN — PHENYTOIN SODIUM SCH MLS/HR: 50 INJECTION INTRAMUSCULAR; INTRAVENOUS at 03:40

## 2018-04-24 RX ADMIN — HEPARIN SODIUM SCH UNITS: 10000 INJECTION, SOLUTION INTRAVENOUS; SUBCUTANEOUS at 21:33

## 2018-04-24 RX ADMIN — LEVOTHYROXINE SODIUM SCH MCG: 112 TABLET ORAL at 05:49

## 2018-04-24 RX ADMIN — DULOXETINE SCH MG: 60 CAPSULE, DELAYED RELEASE ORAL at 21:00

## 2018-04-24 RX ADMIN — TOLTERODINE TARTRATE SCH MG: 2 CAPSULE, EXTENDED RELEASE ORAL at 21:00

## 2018-04-24 NOTE — RADRPT
EXAM DATE/TIME:  04/24/2018 17:46 

 

[facility] COMPARISON:     

CT BRAIN W/O CONTRAST, April 24, 2018, 17:34.

       

 

 

INDICATIONS :     

Neck pain due to possible seizure or mulitple sclerosis, post halicat this morning

                     

 

MEDICAL HISTORY :     

Multiple sclerosis. Renal calculi. Renal failure, chronic. Seizures

 

SURGICAL HISTORY :     

Tonsillectomy. Hysterectomy.   Kidney sx.

 

ENCOUNTER:     

Initial

 

ACUITY:     

1 day

 

PAIN SCORE:     

4/10

 

LOCATION:     

Bilateral cranial 

 

TECHNIQUE:     

Multiplanar, multisequence MRI of the brain was performed without contrast.

 

FINDINGS:     

 

CEREBRUM:     

Diffuse cortical atrophy. The ventricles are normal for age.  No evidence of midline shift, mass lesi
on, hemorrhage or acute infarction.  No extraaxial fluid collections are seen.  The pituitary gland a
nd suprasellar cistern are normal in configuration.

 

WHITE MATTER:     

Moderate to severe chronic flair signal abnormality seen in the periventricular white matter of both 
cerebral hemispheres. Some of the lesions are koffi-colossal in an appearance suggesting Fatima finger
s. There is no associated restricted diffusion.

 

POSTERIOR FOSSA:     

The cerebellum and brainstem are intact.  The 4th ventricle is midline. The cerebellopontine angle is
 unremarkable.  The cerebellar tonsils are normal in position.

 

DIFFUSION IMAGING:     

No focal areas of restricted diffusion are seen.  No evidence of acute infarction.

 

EXTRACRANIAL:     

The visualized portions of the orbits and paranasal sinuses are unremarkable.

 

CONCLUSION:     

1. 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
rosis. There is associated atrophy which is considerably beyond that expected for a patient this age.


2. No bleed or acute infarct demonstrated.

 

 

 

 Ramy Diego MD on April 24, 2018 at 19:10           

Board Certified Radiologist.

 This report was verified electronically.

## 2018-04-24 NOTE — RADRPT
EXAM DATE/TIME:  04/24/2018 17:46 

 

[facility] COMPARISON:     

MRI BRAIN W/O CONTRAST, April 24, 2018, 17:46.

       

 

 

INDICATIONS :     

Neck pain due to possible seizure or mulitple sclerosis, post halicat this morning.

                     

 

MEDICAL HISTORY :     

Renal calculi. Multiple sclerosis. Renal failure, chronic. Seizure.

 

SURGICAL HISTORY :     

Tonsillectomy. Hysterectomy.   Kidney sx, 

 

ENCOUNTER:     

Initial

 

ACUITY:     

1 day

 

PAIN SCORE:     

3/10

 

LOCATION:     

Bilateral   neck region.

 

TECHNIQUE:     

Multiplanar, multisequence MRI examination of the cervical spine was performed.

 

FINDINGS:     

 

VERTEBRAE:     

Normal vertebral body height.  Homogeneous marrow signal.

 

ALIGNMENT:     

No evidence of subluxation.

 

CORD:     

3 x 3 x 6 mm focus of abnormal T2 signal seen in the right side of the cord at the level of C4.

 

POST FOSSA:     

The cerebellar tonsils are normal in position.

 

C2-C3: 

The disc is desiccated. A tiny posterior protrusion is present, effaces the anterior aspect of the th
ecal sac but without cord compression or cord signal abnormality.

 

C3-C4: 

The disc is desiccated and has moderate loss of height. Moderate-sized posterior disc protrusion pres
ent and causing mild to moderate short segment spinal stenosis with borderline cord compression. No a
ssociated cord signal abnormality. There is uncovertebral and facet osteoarthritis causing mild right
 foraminal stenosis.

 

C4-C5: 

The disc is desiccated and has mild loss of height. There is a small moderate, broad central to left 
paracentral disc osteophyte complex and left greater than right uncovertebral and facet osteoarthriti
s. There is moderate spinal stenosis and short segment cord compression. I don't see a cord signal ab
normality at this level. There is mild right and moderate left foraminal stenosis.

 

C5-C6: 

The disc is desiccated. Minimal loss of height. Small moderate, broad central and right paracentral d
isc osteophyte complex causing mild spinal stenosis. No cord compression or cord signal abnormality. 
There is mild to moderate bilateral uncovertebral and facet osteoarthritis. Mild to moderate right fo
raminal stenosis.

 

C6-C7: 

The disc is desiccated and has mild loss of height. There is a small to moderate right paracentral/fo
raminal disc protrusion and mild bilateral uncovertebral and facet osteoarthritis. No cord compressio
n or cord signal abnormality. There is moderate right foraminal stenosis.

 

C7-T1:  

The thecal sac has a normal configuration.  There is no evidence of disc herniation or spinal canal s
tenosis.  The neural foramina are patent bilaterally.

 

CONCLUSION:     

1. No fracture or subluxation seen of the cervical spine.

2. Multilevel degenerative changes as above. Associated spinal stenosis at C3/C4 and C4/C5 without as
sociated cord signal abnormality.

3. The subcentimeter focus of cord signal abnormality at C4 is probably related to multiple sclerosis
. It is not in the region of spinal stenosis. Patient has extensive white matter changes on compariso
n brain MRI today.

 

 

 

 Ramy Diego MD on April 24, 2018 at 20:55           

Board Certified Radiologist.

 This report was verified electronically.

## 2018-04-24 NOTE — RADRPT
EXAM DATE/TIME:  04/24/2018 17:34 

 

[facility] COMPARISON:     

No previous studies available for comparison.

 

 

INDICATIONS :     

Altered mental status.

                      

 

RADIATION DOSE:     

39.96 CTDIvol (mGy) 

 

 

 

MEDICAL HISTORY :     

[facility].  [facility]

 

SURGICAL HISTORY :      

None. 

 

ENCOUNTER:      

Initial

 

ACUITY:      

1 day

 

PAIN SCALE:      

0/10

 

LOCATION:        

cranial 

 

TECHNIQUE:     

Multiple contiguous axial images were obtained of the head.  Using automated exposure control and adj
ustment of the mA and/or kV according to patient size, radiation dose was kept as low as reasonably a
chievable to obtain optimal diagnostic quality images.   DICOM format image data is available electro
nically for review and comparison.  

 

FINDINGS:     

 

CEREBRUM:     

The ventricles are normal for age.  No evidence of midline shift, mass lesion, hemorrhage or acute in
farction.  No extra-axial fluid collections are seen.

 

POSTERIOR FOSSA:     

The cerebellum and brainstem are intact.  The 4th ventricle is midline.  The cerebellopontine angle i
s unremarkable.

 

EXTRACRANIAL:     

The visualized portion of the orbits is intact.

 

SKULL:     

The calvaria is intact.  No evidence of skull fracture.

 

CONCLUSION:     

No acute intracranial findings.

 

 

 

 Barry Osuna MD on April 24, 2018 at 17:46           

Board Certified Radiologist.

 This report was verified electronically.

## 2018-04-24 NOTE — MG
cc:

Federico Camargo MD, PhD

****

 

 

TEST NUMBER:



 

TECHNIQUE:

This is a 17-channel EEG.

 

DESCRIPTION:

The background rhythm reveals symmetrical alpha rhythm, frequency of 8

Hz.  There is fairly prominent muscle artifact.  The patient appears 

to be twitching her head during the recording.  However, I do not see 

anything other than muscle artifact.  There is no true epileptiform 

activity present.  I do not see any underlying convulsive activity or 

seizure activity.  Photic stimulation is done in a stepwise fashion.  

There is a normal driving response.

 

INTERPRETATION:

Overall normal electroencephalogram.  There is muscle artifact 

present, but I do not see any epileptiform discharges.

 

 

__________________________________

Federico Camargo MD, PhD

 

 

RAJWINDER/SB

D: 04/24/2018, 03:06 PM

T: 04/24/2018, 03:25 PM

Visit #: [id number]

Job #: [id number]

## 2018-04-24 NOTE — HHI.PR
Subjective


Remarks


DOC called





Patient seen and examined today.  Reported by nurse Jeremias, patient found to be 

unresponsive this am.  DOC was called seen by DOC RN.  Patient appears 

post ictal.  Notable jerking movement of upper body and jaw.  Not responding to 

verbal, tactile and painful stimuli.  patient eyes closed.  Propped open and 

stayed open.  Pupils reactive and equal.  Positive corneal reflex.  Not 

tracking or focusing.  Mouth breathing with O2 sat 97-99% on RA.  Spoke with 

Dr. Torres and DOC RN will transfer to [facility].  Patient will need stat labs, and 

ABG.  CT of the head.





Objective


Vitals





Vital Signs








  Date Time  Temp Pulse Resp B/P (MAP) Pulse Ox O2 Delivery O2 Flow Rate FiO2


 


4/24/18 06:12 97.4 58 17 93/52 (66) 94   


 


4/23/18 19:02 97.7 65 18 91/45 (60) 97   














I/O      


 


 4/23/18 4/23/18 4/23/18 4/24/18 4/24/18 4/24/18





 07:00 15:00 23:00 07:00 15:00 23:00


 


Intake Total 60 ml 480 ml 480 ml 120 ml  


 


Output Total 300 ml   500 ml  


 


Balance -240 ml 480 ml 480 ml -380 ml  


 


      


 


Intake Oral 60 ml 480 ml 480 ml 120 ml  


 


Output Urine Total 300 ml   500 ml  








Result Diagram:  


4/22/18 0844                                                                   

             4/24/18 0755





Objective Remarks


GENERAL: This is an obese patient. Unresponsive.


CARDIOVASCULAR: Regular rate and rhythm without murmurs, gallops, or rubs. 


RESPIRATORY: No wheezes, rales, or rhonchi.  


GASTROINTESTINAL: Abdomen soft, obese. Hypoactive bowel sounds


MUSCULOSKELETAL: Extremities without clubbing, cyanosis, or edema.


NEURO:  Unresponsive.





A/P


Problem List:  


(1) Multiple sclerosis


ICD Code:  G35 - Multiple sclerosis


Assessment and Plan


Possible suicidal gesture with anxiety and depression will defer to psychiatry





? Seizure


Unresponsive


Jerking movements


   -Appears post ictal


   -Stat labs, CT


   -Transfer to [facility] for further evaluation





Multiple sclerosis, chronic


   -Continue on medications for muscle spasms and continue physical therapy and 

Occupational Therapy continue on Zanaflex


   -Complaints of leg pain today however patient refused last dose of 

gabapentin and has also been refusing Zanaflex on and off in the past





Hypertension, controlled


   -On amlodipine, losartan, metoprolol 25 mg daily


   -Continue monitoring and adjusting accordingly.





GERD- famotidine 20 mg daily





Complicated UTI-Proteus Mirabilis


   -Suspect due to neurogenic bladder from the multiple sclerosis.  Initial UA 

grew E. coli.  Boss catheter was exchanged 4/20, new UA growing Proteus 

mirabilis


   -Bactrim was discontinued and started on IV ceftriaxone





KATIE


   - No reported renal disease per H&P, patient is a poor historian, no family 

at bedside this morning


   - Creatinine on admission was 1.42, BUN 24. Later improved to 1.25


   - Received 4 doses of PO Bactrim for UTI, now on IV Ceftriazone


   - Avoid nephrotoxins


   - If not improved consider renal US or nephro consult if worse





Hypothyroidism 


   - TSH and free T4 wnl, continue on Synthroid (of note patient has been 

refused this in the past)





DVT prophylaxis with heparin (patient has refused several doses in the past)











Nanci Camarena Apr 24, 2018 09:42

## 2018-04-24 NOTE — RADRPT
EXAM DATE/TIME:  04/24/2018 09:52 

 

[facility] COMPARISON:     

No previous studies available for comparison.

 

                     

INDICATIONS :     

Short of breath, evaluate pneumonia

                     

 

MEDICAL HISTORY :            

altered mental status   

 

SURGICAL HISTORY :        

unobtainable

 

ENCOUNTER:     

Initial                                        

 

ACUITY:     

1 day      

 

PAIN SCORE:     

Non-responsive.

 

LOCATION:     

Bilateral chest 

 

FINDINGS:     

A single view of the chest demonstrates the lungs to be symmetrically aerated without evidence of mas
s, infiltrate or effusion.  The cardiomediastinal contours are unremarkable.  Osseous structures are 
intact.

 

CONCLUSION:     

1. No acute cardiopulmonary disease.

 

 

 

 Cheng Chandra MD on April 24, 2018 at 10:41           

Board Certified Radiologist.

 This report was verified electronically.

## 2018-04-24 NOTE — PD.CONS
History of Present Illness


Service


Neurology


Consult Requested By


medical team


Reason for Consult


episode of unresponsiveness


Primary Care Physician


Unknown


History of Present Illness


61 y/o female consulted for episode of unresponsiveness.  She denies hx of 

seizure or stroke.  Reports hx of MS since she was 17.  


denies relapses.  Has hx of exacerbations when she has an upper respiratory 

infection.  She has hx of tremor for past few years.  She follows with 

neurologist in [place] for her MS.


She is wheelchair bound for the last 5 years.  She lives in a nursing facility.

  She has urinary incontinence, no bowel incontinence.


She is not on medications for MS.  


Had EEG that was unremarkable


 (Rachelle Meza)





Review of Systems


All other ROS:  ROS reviewed as documented in chart


 (Rachelle Meza)





Past Family Social History


Allergies:  


Coded Allergies:  


     hydromorphone (Verified  Allergy, Severe, 4/17/18)


     midazolam (Verified  Allergy, Severe, 4/17/18)


Past Medical History


MS, tremor, obesity, weakness


Active Ordered Medications





Current Medications








 Medications


  (Trade)  Dose


 Ordered  Sig/Leonela


 Route  Start Time


 Stop Time Status Last Admin


 


  (Norvasc)  5 mg  DAILY


 PO  4/25/18 09:00


     


 


 


  (Lotrimin 1%


 Cream)  1 applic  Q8HR


 TOPICAL  4/24/18 14:00


     


 


 


  (Vitamin B12)  500 mcg  DAILY


 PO  4/25/18 09:00


     


 


 


  (Cymbalta Dr)  60 mg  BID


 PO  4/24/18 21:00


     


 


 


  (Neurontin)  100 mg  TID


 PO  4/24/18 13:00


    4/24/18 12:30


 


 


  (Synthroid)  112 mcg  DAILY@0600


 PO  4/25/18 06:00


     


 


 


  (Cozaar)  25 mg  DAILY


 PO  4/25/18 09:00


     


 


 


  (Toprol Xl)  25 mg  DAILY


 PO  4/25/18 09:00


     


 


 


  (Zanaflex)  4 mg  Q6HR


 PO  4/24/18 13:15


     


 


 


  (Vitamin D3)  2,000 units  DAILY


 PO  4/25/18 09:00


     


 


 


  (Tears Naturale


 Opth Soln)  1 drop  BID


 EACH EYE  4/24/18 21:00


     


 


 


  (Pepcid)  20 mg  DAILY


 PO  4/25/18 09:00


     


 


 


  (Detrol La)  2 mg  HS


 PO  4/24/18 21:00


     


 


 


  (Theragran)  1 tab  DAILY


 PO  4/25/18 09:00


     


 


 


  (Heparin Inj)  5,000 units  Q8HR


 SQ  4/24/18 14:00


    4/24/18 14:55


 


 


  (Xanax)  0.5 mg  BID


 PO  4/24/18 21:00


     


 


 


 Dextrose/Sodium


 Chloride  1,000 ml @ 


 84 mls/hr  [id number]


 IV  4/24/18 15:30


    4/24/18 16:00


 








Social History


lives in nursing home


 (Rachelle Meza)





Exam


I&O / VS











 4/24/18 4/24/18 4/25/18





 15:00 23:00 07:00


 


Output Total 300 ml  


 


Balance -300 ml  


 


   


 


Output Urine Total 300 ml  








Vital Signs








  Date Time  Temp Pulse Resp B/P (MAP) Pulse Ox O2 Delivery O2 Flow Rate FiO2


 


4/24/18 12:00 97.6 69 15 99/63 (75) 98   


 


4/24/18 09:30 97.9 96 11 166/103 (124) 97   


 


4/24/18 09:30  96      








General:  Alert and Oriented, No acute distress


Eye:  PERRL


Respiratory:  Non-labored respirations


Cardiology:  Normal rate


Neurologic:  Alert, Oriented


Psychiatric:  Cooperative


Exam Comments


alert and orient x 3, mild kinetic tremor noted, dyscongugae gaze, eomi, no 

nystagmus, no facial droop, moves upper extremities against gravity, f-n-f 

intact, unable to move lower extremities can move great toe on the right, 

diminished DTRs, wheelchair bound, 


 (Rachelle Meza)





Review/Management


Diagnosis/Plan:  


(1) Multiple sclerosis


ICD Codes:  G35 - Multiple sclerosis


Status:  Chronic


Plan:  MRI brain, cspine








currently has UTI


monitor electrolytes


potassium elevated


 (Rachelle Meza)


Diagnosis/Plan:  


(1) HTN (hypertension)


ICD Codes:  I10 - Essential (primary) hypertension


(2) Chronic pain


ICD Codes:  G89.29 - Other chronic pain


(3) Wheelchair bound


ICD Codes:  Z99.3 - Dependence on wheelchair


(4) Multiple sclerosis


ICD Codes:  G35 - Multiple sclerosis


Status:  Chronic


Plan:  MRI brain, cspine








currently has UTI


monitor electrolytes


potassium elevated





Daily Summary


d/w PA. agree with above. 


pre-renal azotemia, dehydration may be a factor


f/u mri brain/cspine-pending





eeg- nml





increase gabapentin to 200mg tid for pain and possible sz. should stop 

wellbutrin





d/c from neurology





p.t.


 (Malcom Gtaes MD)











Rachelle Meza Apr 24, 2018 16:29


Malcom Gates MD Apr 24, 2018 19:14

## 2018-04-24 NOTE — HHI.HP
__________________________________________________





HPI


Service


[facility]


Primary Care Physician


Unknown


Admission Diagnosis





Diagnoses:  


Travel History


International Travel<30 Days:  No


Contact w/Intl Traveler <30 Da:  No


Traveled to Known Affected Are:  No


History of Present Illness


abbet called for unresponsiveness 


per nurse, pt never had similar issues


on review of records, seems that pt was on xanax 0.5mg po bid at SNF


which was held here


also on welbutrin 


being managed for UTI with rocephin





pt is not able to give me hx as she is mostly screaming and yelling telling 

staff and myself to stop with questioning and tests


per her psychiatry nurse, this is her baseline while at psychiatry as well





Review of Systems


ROS Limitations:  Uncooperative, Combative, Poor Historian





Past Family Social History


Allergies:  


Coded Allergies:  


     hydromorphone (Verified  Allergy, Severe, 18)


     midazolam (Verified  Allergy, Severe, 18)





Physical Exam


Physical Exam


GENERAL: This is elderly lady, looks much older than her age, obese, screaming 

in her room, ' stop" , would not answer questions- psychiatry nurse stated she 

is back to her baseline 


SKIN: No rashes, ecchymoses or lesions. Cool and dry.


HEAD: Atraumatic. Normocephalic. No temporal or scalp tenderness.


EYES:  No scleral icterus. No injection or drainage. 


ENT: Nose without bleeding, purulent drainage or septal hematoma. Airway patent.


NECK: Trachea midline. No JVD  Supple, nontender, no meningeal signs.


CARDIOVASCULAR: Regular rate and rhythm without murmurs, gallops, or rubs. 


RESPIRATORY: decreased air entry bilaterally


GASTROINTESTINAL: Abdomen soft, non-tender, nondistended.  No guarding.


MUSCULOSKELETAL: Extremities without clubbing, cyanosis, or edema.  No calf 

tenderness. 


NEUROLOGICAL: Awake, screaming - which is somewhat baseline since she has been 

here, Motor and sensory grossly within normal limits. Normal speech.


Laboratory





Laboratory Tests








Test


  18


09:25


 


Blood Gas Puncture Site RT RADIAL 


 


Blood Gas Patient Temperature 98.6 


 


Blood Gas HCO3 25 


 


Blood Gas Base Excess -0.4 


 


Blood Gas Oxygen Saturation 93 


 


Arterial Blood pH 7.35 


 


Arterial Blood Partial


Pressure CO2 46 


 


 


Arterial Blood Partial


Pressure O2 82 


 


 


Arterial Blood Oxygen Content 19.1 


 


Arterial Blood


Carboxyhemoglobin 1.1 


 


 


Arterial Blood Methemoglobin 1.3 


 


Blood Gas Hemoglobin 14.5 


 


Blood Gas Inspired Oxygen 21 











Caprini VTE Risk Assessment


Caprini VTE Risk Assessment:  Mod/High Risk (score >= 2)


Caprini Risk Assessment Model











 Point Value = 1          Point Value = 2  Point Value = 3  Point Value = 5


 


Age 41-60


Minor surgery


BMI > 25 kg/m2


Swollen legs


Varicose veins


Pregnancy or postpartum


History of unexplained or recurrent


   spontaneous 


Oral contraceptives or hormone


   replacement


Sepsis (< 1 month)


Serious lung disease, including


   pneumonia (< 1 month)


Abnormal pulmonary function


Acute myocardial infarction


Congestive heart failure (< 1 month)


History of inflammatory bowel disease


Medical patient at bed rest Age 61-74


Arthroscopic surgery


Major open surgery (> 45 min)


Laparoscopic surgery (> 45 min)


Malignancy


Confined to bed (> 72 hours)


Immobilizing plaster cast


Central venous access Age >= 75


History of VTE


Family history of VTE


Factor V Leiden


Prothrombin [id number]


Lupus anticoagulant


Anticardiolipin antibodies


Elevated serum homocysteine


Heparin-induced thrombocytopenia


Other congenital or acquired


   thrombophilia Stroke (< 1 month)


Elective arthroplasty


Hip, pelvis, or leg fracture


Acute spinal cord injury (< 1 month)








Prophylaxis Regimen











   Total Risk


Factor Score Risk Level Prophylaxis Regimen


 


0-1      Low Early ambulation


 


2 Moderate Order ONE of the following:


*Sequential Compression Device (SCD)


*Heparin 5000 units SQ BID


 


3-4 Higher Order ONE of the following medications:


*Heparin 5000 units SQ TID


*Enoxaparin/Lovenox 40 mg SQ daily (WT < 150 kg, CrCl > 30 mL/min)


*Enoxaparin/Lovenox 30 mg SQ daily (WT < 150 kg, CrCl > 10-29 mL/min)


*Enoxaparin/Lovenox 30 mg SQ BID (WT < 150 kg, CrCl > 30 mL/min)


AND/OR


*Sequential Compression Device (SCD)


 


5 or more Highest Order ONE of the following medications:


*Heparin 5000 units SQ TID (Preferred with Epidurals)


*Enoxaparin/Lovenox 40 mg SQ daily (WT < 150 kg, CrCl > 30 mL/min)


*Enoxaparin/Lovenox 30 mg SQ daily (WT < 150 kg, CrCl > 10-29 mL/min)


*Enoxaparin/Lovenox 30 mg SQ BID (WT < 150 kg, CrCl > 30 mL/min)


AND


*Sequential Compression Device (SCD)











Assessment and Plan


Assessment and Plan


Impression:





unresponsivensss- likely seizures


possible benzo withdrawal seizures 


on meds that would lower seizure threshold as well 


uti 


indwelling blake 


KATIE vs acute on chronic kidney dx





MS


HTN


GERD  


hypothyroidism








Plan: 





eeg 


cxr


ct brain 


labs to r/o metabolic cause


will need to confirm with NH records regarding xanax - orders written and spoke 

to nurse to get med list from nursing home 





dvt prophylaxis on heparin





11:45a.m





NH confirmed pt was on xanax 0.5mg po bid 


per NH staff who spoke to our nurse Jeremias pt "she does this all the time" 

regarding her unresponsiveness


will await eeg results


based on neuro recommendations, will consider MRI - for structural lesions if 

this is truly not her first time episode of unresponsiveness/ post ictal - 

otherwise, hx supports  more of benzo withdrawal seizures





changed to inpatient due to benzo withdrawal, KATIE


Discussed Condition With


patient, nursing staff, [facility] team











Hollie Torres MD 2018 09:49
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
## 2018-04-25 VITALS
HEART RATE: 68 BPM | RESPIRATION RATE: 18 BRPM | TEMPERATURE: 97.8 F | OXYGEN SATURATION: 95 % | DIASTOLIC BLOOD PRESSURE: 56 MMHG | SYSTOLIC BLOOD PRESSURE: 116 MMHG

## 2018-04-25 VITALS — HEART RATE: 63 BPM

## 2018-04-25 VITALS
RESPIRATION RATE: 18 BRPM | HEART RATE: 84 BPM | OXYGEN SATURATION: 94 % | DIASTOLIC BLOOD PRESSURE: 79 MMHG | SYSTOLIC BLOOD PRESSURE: 162 MMHG | TEMPERATURE: 97.9 F

## 2018-04-25 VITALS
TEMPERATURE: 98.1 F | HEART RATE: 62 BPM | RESPIRATION RATE: 18 BRPM | SYSTOLIC BLOOD PRESSURE: 85 MMHG | OXYGEN SATURATION: 98 % | DIASTOLIC BLOOD PRESSURE: 50 MMHG

## 2018-04-25 VITALS
RESPIRATION RATE: 18 BRPM | DIASTOLIC BLOOD PRESSURE: 53 MMHG | TEMPERATURE: 98.2 F | SYSTOLIC BLOOD PRESSURE: 103 MMHG | HEART RATE: 65 BPM | OXYGEN SATURATION: 96 %

## 2018-04-25 VITALS
DIASTOLIC BLOOD PRESSURE: 56 MMHG | TEMPERATURE: 97.5 F | RESPIRATION RATE: 17 BRPM | HEART RATE: 67 BPM | OXYGEN SATURATION: 98 % | SYSTOLIC BLOOD PRESSURE: 99 MMHG

## 2018-04-25 VITALS — HEART RATE: 64 BPM

## 2018-04-25 VITALS
RESPIRATION RATE: 18 BRPM | DIASTOLIC BLOOD PRESSURE: 53 MMHG | HEART RATE: 61 BPM | SYSTOLIC BLOOD PRESSURE: 111 MMHG | OXYGEN SATURATION: 96 % | TEMPERATURE: 97.7 F

## 2018-04-25 VITALS — HEART RATE: 78 BPM

## 2018-04-25 VITALS — HEART RATE: 67 BPM

## 2018-04-25 VITALS — HEART RATE: 65 BPM

## 2018-04-25 VITALS — SYSTOLIC BLOOD PRESSURE: 93 MMHG | DIASTOLIC BLOOD PRESSURE: 46 MMHG

## 2018-04-25 LAB
BUN SERPL-MCNC: 33 MG/DL (ref 7–18)
CALCIUM SERPL-MCNC: 8.8 MG/DL (ref 8.5–10.1)
CHLORIDE SERPL-SCNC: 105 MEQ/L (ref 98–107)
CREAT SERPL-MCNC: 1.49 MG/DL (ref 0.5–1)
GFR SERPLBLD BASED ON 1.73 SQ M-ARVRAT: 35 ML/MIN (ref 89–?)
GLUCOSE SERPL-MCNC: 159 MG/DL (ref 74–106)
HCO3 BLD-SCNC: 25.9 MEQ/L (ref 21–32)
SODIUM SERPL-SCNC: 139 MEQ/L (ref 136–145)

## 2018-04-25 RX ADMIN — DULOXETINE SCH MG: 60 CAPSULE, DELAYED RELEASE ORAL at 09:28

## 2018-04-25 RX ADMIN — HEPARIN SODIUM SCH UNITS: 10000 INJECTION, SOLUTION INTRAVENOUS; SUBCUTANEOUS at 13:51

## 2018-04-25 RX ADMIN — GABAPENTIN SCH MG: 100 CAPSULE ORAL at 09:28

## 2018-04-25 RX ADMIN — Medication SCH MCG: at 09:30

## 2018-04-25 RX ADMIN — FAMOTIDINE SCH MG: 20 TABLET, FILM COATED ORAL at 09:29

## 2018-04-25 RX ADMIN — PHENYTOIN SODIUM SCH MLS/HR: 50 INJECTION INTRAMUSCULAR; INTRAVENOUS at 11:30

## 2018-04-25 RX ADMIN — GABAPENTIN SCH MG: 100 CAPSULE ORAL at 17:14

## 2018-04-25 RX ADMIN — HEPARIN SODIUM SCH UNITS: 10000 INJECTION, SOLUTION INTRAVENOUS; SUBCUTANEOUS at 21:33

## 2018-04-25 RX ADMIN — PHENYTOIN SODIUM SCH MLS/HR: 50 INJECTION INTRAMUSCULAR; INTRAVENOUS at 21:36

## 2018-04-25 RX ADMIN — GABAPENTIN SCH MG: 100 CAPSULE ORAL at 12:35

## 2018-04-25 RX ADMIN — CLOTRIMAZOLE SCH APPLIC: 10 CREAM TOPICAL at 13:51

## 2018-04-25 RX ADMIN — MAGNESIUM SULFATE HEPTAHYDRATE SCH MLS/HR: 500 INJECTION, SOLUTION INTRAMUSCULAR; INTRAVENOUS at 05:26

## 2018-04-25 RX ADMIN — METOPROLOL SUCCINATE SCH MG: 25 TABLET, EXTENDED RELEASE ORAL at 09:00

## 2018-04-25 RX ADMIN — VITAMIN D, TAB 1000IU (100/BT) SCH UNITS: 25 TAB at 09:30

## 2018-04-25 RX ADMIN — POLYVINYL ALCOHOL SCH DROP: 14 SOLUTION/ DROPS OPHTHALMIC at 09:26

## 2018-04-25 RX ADMIN — HEPARIN SODIUM SCH UNITS: 10000 INJECTION, SOLUTION INTRAVENOUS; SUBCUTANEOUS at 05:22

## 2018-04-25 RX ADMIN — POLYVINYL ALCOHOL SCH DROP: 14 SOLUTION/ DROPS OPHTHALMIC at 21:33

## 2018-04-25 RX ADMIN — TOLTERODINE TARTRATE SCH MG: 2 CAPSULE, EXTENDED RELEASE ORAL at 21:33

## 2018-04-25 RX ADMIN — LEVOTHYROXINE SODIUM SCH MCG: 112 TABLET ORAL at 05:21

## 2018-04-25 RX ADMIN — DULOXETINE SCH MG: 60 CAPSULE, DELAYED RELEASE ORAL at 21:33

## 2018-04-25 RX ADMIN — CLOTRIMAZOLE SCH APPLIC: 10 CREAM TOPICAL at 21:33

## 2018-04-25 RX ADMIN — ACYCLOVIR SCH TAB: 800 TABLET ORAL at 09:30

## 2018-04-25 RX ADMIN — CLOTRIMAZOLE SCH APPLIC: 10 CREAM TOPICAL at 05:18

## 2018-04-25 NOTE — HHI.PYPN
Subjective


Remarks


The patient was seen today for psychiatric reevaluation.  She was found having 

her breakfast, she reports a good mood, being in a good spirits.  She says that 

nothing is better than "having breakfast watching my favorite TV show".  She 

seems to be more organized today, but still labile. She denies hearing voices 

at the moment, "but, yes sometimes".  The patient is partially oriented in 

place and time, she knows that she is in the hospital in April 2018.  She is 

compliant with her medications, no significant side effects reported.





Mental Status Examination


Appearance:  Appropriate


Consciousness:  Alert


Orientation:  Person


Motor Activity:  Normal gait


Speech:  Unremarkable


Language:  Adequate


Fund of Knowledge:  Adequate


Attention and Concentration:  Adequate


Memory:  Unremarkable


Mood:  Appropriate


Affect:  Appropriate


Thought Process & Associations:  Intact


Thought Content:  Appropriate


Hallucination Type:  Auditory


Delusion Type:  None


Suicidal Ideation:  No


Suicidal Plan:  No


Suicidal Intention:  No


Homicidal Ideation:  No


Homicidal Plan:  No


Homicidal Intention:  No


Insight:  Fair


Judgment:  Impulsive





Results


Labs











Test


  4/24/18


11:07 4/25/18


08:17


 


White Blood Count 8.6 TH/MM3  


 


Red Blood Count 4.19 MIL/MM3  


 


Hemoglobin 13.7 GM/DL  


 


Hematocrit 39.9 %  


 


Mean Corpuscular Volume 95.2 FL  


 


Mean Corpuscular Hemoglobin 32.8 PG  


 


Mean Corpuscular Hemoglobin


Concent 34.4 % 


  


 


 


Red Cell Distribution Width 13.6 %  


 


Platelet Count 187 TH/MM3  


 


Mean Platelet Volume 10.0 FL  


 


Neutrophils (%) (Auto) 69.9 %  


 


Lymphocytes (%) (Auto) 19.6 %  


 


Monocytes (%) (Auto) 7.4 %  


 


Eosinophils (%) (Auto) 2.0 %  


 


Basophils (%) (Auto) 1.1 %  


 


Neutrophils # (Auto) 6.0 TH/MM3  


 


Lymphocytes # (Auto) 1.7 TH/MM3  


 


Monocytes # (Auto) 0.6 TH/MM3  


 


Eosinophils # (Auto) 0.2 TH/MM3  


 


Basophils # (Auto) 0.1 TH/MM3  


 


CBC Comment DIFF FINAL  


 


Differential Comment   


 


Hematology Comments   


 


Blood Urea Nitrogen 35 MG/DL  


 


Creatinine 1.62 MG/DL  


 


Random Glucose 148 MG/DL  


 


Total Protein 6.9 GM/DL  


 


Albumin 2.6 GM/DL  


 


Calcium Level 9.1 MG/DL  


 


Alkaline Phosphatase 96 U/L  


 


Aspartate Amino Transf


(AST/SGOT) 21 U/L 


  


 


 


Alanine Aminotransferase


(ALT/SGPT) 23 U/L 


  


 


 


Total Bilirubin 0.3 MG/DL  


 


Sodium Level 140 MEQ/L  


 


Potassium Level 5.5 MEQ/L  


 


Chloride Level 106 MEQ/L  


 


Carbon Dioxide Level 24.8 MEQ/L  


 


Anion Gap 9 MEQ/L  


 


Estimat Glomerular Filtration


Rate 32 ML/MIN 


  


 








Vitals/IOs





Vital Signs








  Date Time  Temp Pulse Resp B/P (MAP) Pulse Ox O2 Delivery O2 Flow Rate FiO2


 


4/25/18 09:24    93/46 (62)    


 


4/25/18 08:04 98.1 62 18  98   














Intake and Output   


 


 4/25/18 4/25/18 4/26/18





 08:00 16:00 00:00


 


Intake Total 440 ml  


 


Balance 440 ml  











Assessment & Plan


Problem List:  


(1) Unspecified psychosis


ICD Codes:  F29 - Unspecified psychosis not due to a substance or known 

physiological condition


Status:  Acute


Assessment & Plan:  Agree with discontinuing Wellbutrin due to high risk of 

seizures.  Will restart Risperdal 0.5 mg twice daily.  Patient continues to 

show symptoms of psychosis.  Will transfer back to psychiatry once medically 

appropriate.





Assessment & Plan


Estimated LOS:  days


Justification for Cont. Inpt.


Patient will be transferred back to med psych once medically appropriate.











Raoul Rosenthal MD Apr 25, 2018 09:49

## 2018-04-25 NOTE — HHI.PR
Review/Management


Diagnosis/Plan:  


(1) Multiple sclerosis


ICD Codes:  G35 - Multiple sclerosis


Status:  Chronic


Plan:  MRI brain, cspine- no acute lesion; +old ms in brain and cervical cord





pre-renal azotemia, dehydration may be a factor








eeg- nml





recs


neuro stable





increase gabapentin to 200mg tid for pain and possible sz. should stop 

wellbutrin





d/c from neurology today





outpatient f/u with her neurologist





(2) HTN (hypertension)


ICD Codes:  I10 - Essential (primary) hypertension


Status:  Chronic


(3) Chronic pain


ICD Codes:  G89.29 - Other chronic pain


Status:  Chronic


(4) Wheelchair bound


ICD Codes:  Z99.3 - Dependence on wheelchair





Subjective


Subjective Comments


No acute events reported


No headache


No chest pain


No dyspnea


Active Medications





Current Medications








 Medications


  (Trade)  Dose


 Ordered  Sig/Leonela


 Route  Start Time


 Stop Time Status Last Admin


 


  (Norvasc)  5 mg  DAILY


 PO  4/25/18 09:00


     


 


 


  (Lotrimin 1%


 Cream)  1 applic  Q8HR


 TOPICAL  4/24/18 14:00


    4/24/18 21:30


 


 


  (Vitamin B12)  500 mcg  DAILY


 PO  4/25/18 09:00


    4/25/18 09:30


 


 


  (Cymbalta Dr)  60 mg  BID


 PO  4/24/18 21:00


    4/25/18 09:28


 


 


  (Synthroid)  112 mcg  DAILY@0600


 PO  4/25/18 06:00


    4/25/18 05:21


 


 


  (Cozaar)  25 mg  DAILY


 PO  4/25/18 09:00


     


 


 


  (Toprol Xl)  25 mg  DAILY


 PO  4/25/18 09:00


     


 


 


  (Zanaflex)  4 mg  Q6HR


 PO  4/24/18 13:15


    4/25/18 05:21


 


 


  (Vitamin D3)  2,000 units  DAILY


 PO  4/25/18 09:00


    4/25/18 09:30


 


 


  (Tears Naturale


 Opth Soln)  1 drop  BID


 EACH EYE  4/24/18 21:00


    4/25/18 09:26


 


 


  (Pepcid)  20 mg  DAILY


 PO  4/25/18 09:00


    4/25/18 09:29


 


 


  (Detrol La)  2 mg  HS


 PO  4/24/18 21:00


     


 


 


  (Theragran)  1 tab  DAILY


 PO  4/25/18 09:00


    4/25/18 09:30


 


 


  (Heparin Inj)  5,000 units  Q8HR


 SQ  4/24/18 14:00


    4/25/18 05:22


 


 


  (Xanax)  0.5 mg  BID


 PO  4/24/18 21:00


    4/25/18 09:29


 


 


 Dextrose/Sodium


 Chloride  1,000 ml @ 


 84 mls/hr  [id number]


 IV  4/24/18 15:30


    4/25/18 05:26


 


 


  (Neurontin)  200 mg  TID


 PO  4/25/18 09:00


    4/25/18 09:28


 








Allergies





Allergies


Coded Allergies


  hydromorphone (Verified Allergy, Severe, 4/17/18)


  midazolam (Verified Allergy, Severe, 4/17/18)





Review of Systems


All other ROS:  ROS reviewed as documented in chart





Exam


I&O / VS





Vital Signs








  Date Time  Temp Pulse Resp B/P (MAP) Pulse Ox O2 Delivery O2 Flow Rate FiO2


 


4/25/18 09:24    93/46 (62)    


 


4/25/18 08:04 98.1 62 18 85/50 (62) 98   


 


4/25/18 04:08  67      


 


4/25/18 04:00 97.5 67 17 99/56 (70) 98   


 


4/25/18 00:00 97.9 84 18 162/79 (106) 94   


 


4/24/18 23:40  80      


 


4/24/18 20:28  93      


 


4/24/18 20:00 97.4 81 20 152/64 (93) 93   


 


4/24/18 12:00 97.6 69 15 99/63 (75) 98   








General:  Alert and Oriented, No acute distress


Eye:  PERRL


Respiratory:  Non-labored respirations


Cardiology:  Normal rate


Neurologic:  Alert, Oriented


Psychiatric:  Cooperative


Exam Comments


alert, ox 2-3, "i'm at the hospital that starts with H", follows, calm, eating 

breakfast, eomi, face sym, ue 5-/5, paraparesis with bilateral extensor planter 

response





Objective


Micro and Labs





Laboratory Tests








Test


  4/24/18


11:07 4/25/18


08:17


 


White Blood Count 8.6  


 


Red Blood Count 4.19  


 


Hemoglobin 13.7  


 


Hematocrit 39.9  


 


Mean Corpuscular Volume 95.2  


 


Mean Corpuscular Hemoglobin 32.8  


 


Mean Corpuscular Hemoglobin


Concent 34.4 


  


 


 


Red Cell Distribution Width 13.6  


 


Platelet Count 187  


 


Mean Platelet Volume 10.0  


 


Neutrophils (%) (Auto) 69.9  


 


Lymphocytes (%) (Auto) 19.6  


 


Monocytes (%) (Auto) 7.4  


 


Eosinophils (%) (Auto) 2.0  


 


Basophils (%) (Auto) 1.1  


 


Neutrophils # (Auto) 6.0  


 


Lymphocytes # (Auto) 1.7  


 


Monocytes # (Auto) 0.6  


 


Eosinophils # (Auto) 0.2  


 


Basophils # (Auto) 0.1  


 


CBC Comment DIFF FINAL  


 


Differential Comment   


 


Hematology Comments   


 


Blood Urea Nitrogen 35  


 


Creatinine 1.62  


 


Random Glucose 148  


 


Total Protein 6.9  


 


Albumin 2.6  


 


Calcium Level 9.1  


 


Alkaline Phosphatase 96  


 


Aspartate Amino Transf


(AST/SGOT) 21 


  


 


 


Alanine Aminotransferase


(ALT/SGPT) 23 


  


 


 


Total Bilirubin 0.3  


 


Sodium Level 140  


 


Potassium Level 5.5  


 


Chloride Level 106  


 


Carbon Dioxide Level 24.8  


 


Anion Gap 9  


 


Estimat Glomerular Filtration


Rate 32 


  


 











Problem Qualifiers





(1) HTN (hypertension):  


Qualified Codes:  I10 - Essential (primary) hypertension


(2) Chronic pain:  


Qualified Codes:  G89.4 - Chronic pain syndrome








Malcom Gates MD Apr 25, 2018 09:36

## 2018-04-25 NOTE — HHI.PR
Subjective


Remarks


patient awake and alert and interactive appropriate- actually funny and smart  

on exam


denies any suicidal ideations


history of depression - 


continent of urine and stools





Objective


Vitals





Vital Signs








  Date Time  Temp Pulse Resp B/P (MAP) Pulse Ox O2 Delivery O2 Flow Rate FiO2


 


4/25/18 12:10 98.2 65 18 103/53 (70) 96   


 


4/25/18 09:24    93/46 (62)    


 


4/25/18 08:04 98.1 62 18 85/50 (62) 98   


 


4/25/18 08:00  63      


 


4/25/18 04:08  67      


 


4/25/18 04:00 97.5 67 17 99/56 (70) 98   


 


4/25/18 00:00 97.9 84 18 162/79 (106) 94   


 


4/24/18 23:40  80      


 


4/24/18 20:28  93      


 


4/24/18 20:00 97.4 81 20 152/64 (93) 93   














I/O      


 


 4/24/18 4/24/18 4/24/18 4/25/18 4/25/18 4/25/18





 07:00 15:00 23:00 07:00 15:00 23:00


 


Intake Total    440 ml 756 ml 


 


Output Total  300 ml    


 


Balance  -300 ml  440 ml 756 ml 


 


      


 


Intake Oral    440 ml  


 


IV Total     756 ml 


 


Output Urine Total  300 ml    


 


# Voids    6  


 


# Bowel Movements    0  








Result Diagram:  


4/24/18 1107                                                                   

             4/25/18 0817





Imaging





Last Impressions








Head CT 4/24/18 0000 Signed





Impressions: 





 Service Date/Time:  Tuesday, April 24, 2018 17:34 - CONCLUSION:  No acute 





 intracranial findings.     Barry Osuna MD 


 


Chest X-Ray 4/24/18 0000 Signed





Impressions: 





 Service Date/Time:  Tuesday, April 24, 2018 09:52 - CONCLUSION:  1. No acute 





 cardiopulmonary disease.     Cheng Chandra MD 


 


Cervical Spine MRI 4/24/18 0000 Signed





Impressions: 





 Service Date/Time:  Tuesday, April 24, 2018 17:46 - CONCLUSION:  1. No 

fracture 





 or subluxation seen of the cervical spine. 2. Multilevel degenerative changes 

as 





 above. Associated spinal stenosis at C3/C4 and C4/C5 without associated cord 





 signal abnormality. 3. The subcentimeter focus of cord signal abnormality at 

C4 





 is probably related to multiple sclerosis. It is not in the region of spinal 





 stenosis. Patient has extensive white matter changes on comparison brain MRI 





 today.     Ramy Diego MD 


 


Brain MRI 4/24/18 0000 Signed





Impressions: 





 Service Date/Time:  Tuesday, April 24, 2018 17:46 - CONCLUSION:  1. Moderate 

to 





 severe chronic white matter disease, nonspecific but probably related to 





 multiple sclerosis. There is associated atrophy which is considerably beyond 





 that expected for a patient this age. 2. No bleed or acute infarct 

demonstrated. 





     Ramy Diego MD 








Objective Remarks


awake and alert, no acute distess, oriented x 3, speech clear


anciteric


neck supple


lungs- no rales


regular rhythm


abdomen-flabby but soft, good bowel sounds


extremities no edema- LE- 0-1/5- able to move toes at the most


UE - good 


sensory intact





A/P


Assessment and Plan


62 years old female








Unnresponsivenss- Resolved - likely seizures


possible benzo withdrawal seizures


LIkely postictal- due to Benzo withdrawal


History of MS


- NH confirmed pt was on xanax 0.5mg po bid 


per NH staff who spoke to our nurse Jeremias pt "she does this all the time" 

regarding her unresponsiveness


EEG normal


Gabapentin 200 mg bid


Welbutrin DC





Psychoses


- started on Risperdal bid by psychiatry





UTI


-continue antibiotics








KATIE vs acute on chronic kidney dx


- continue fluids


-BMP in am





HTN- low borderline BPs


- hold meds parameter


- likely with some autonomic dysfucntion with MS





GERD  - PPI


hypothyroidism- synthroid








dvt prophylaxis on heparin








if stable tomorrow- DC back to med psych











Pan Nieto MD Apr 25, 2018 13:34

## 2018-04-26 ENCOUNTER — HOSPITAL ENCOUNTER (INPATIENT)
Dept: HOSPITAL 17 - H4EA | Age: 63
LOS: 6 days | Discharge: NURSING, REHAB OR LONG-TERM CARE | DRG: 885 | End: 2018-05-02
Attending: PSYCHIATRY & NEUROLOGY | Admitting: PSYCHIATRY & NEUROLOGY
Payer: COMMERCIAL

## 2018-04-26 VITALS
TEMPERATURE: 97.3 F | RESPIRATION RATE: 18 BRPM | OXYGEN SATURATION: 97 % | HEART RATE: 60 BPM | SYSTOLIC BLOOD PRESSURE: 120 MMHG | DIASTOLIC BLOOD PRESSURE: 60 MMHG

## 2018-04-26 VITALS
TEMPERATURE: 97.3 F | HEART RATE: 67 BPM | SYSTOLIC BLOOD PRESSURE: 140 MMHG | DIASTOLIC BLOOD PRESSURE: 68 MMHG | RESPIRATION RATE: 18 BRPM | OXYGEN SATURATION: 98 %

## 2018-04-26 VITALS
SYSTOLIC BLOOD PRESSURE: 133 MMHG | OXYGEN SATURATION: 95 % | TEMPERATURE: 97.4 F | HEART RATE: 56 BPM | DIASTOLIC BLOOD PRESSURE: 65 MMHG | RESPIRATION RATE: 18 BRPM

## 2018-04-26 VITALS
TEMPERATURE: 97.6 F | SYSTOLIC BLOOD PRESSURE: 125 MMHG | OXYGEN SATURATION: 96 % | DIASTOLIC BLOOD PRESSURE: 64 MMHG | RESPIRATION RATE: 18 BRPM | HEART RATE: 55 BPM

## 2018-04-26 VITALS
TEMPERATURE: 97.6 F | OXYGEN SATURATION: 97 % | DIASTOLIC BLOOD PRESSURE: 68 MMHG | HEART RATE: 65 BPM | SYSTOLIC BLOOD PRESSURE: 110 MMHG | RESPIRATION RATE: 18 BRPM

## 2018-04-26 VITALS — WEIGHT: 215.17 LBS | BODY MASS INDEX: 38.12 KG/M2 | HEIGHT: 63 IN

## 2018-04-26 VITALS — HEART RATE: 72 BPM

## 2018-04-26 VITALS — HEART RATE: 61 BPM

## 2018-04-26 DIAGNOSIS — F94.0: ICD-10-CM

## 2018-04-26 DIAGNOSIS — K21.9: ICD-10-CM

## 2018-04-26 DIAGNOSIS — N39.0: ICD-10-CM

## 2018-04-26 DIAGNOSIS — G35: ICD-10-CM

## 2018-04-26 DIAGNOSIS — N17.9: ICD-10-CM

## 2018-04-26 DIAGNOSIS — I12.9: ICD-10-CM

## 2018-04-26 DIAGNOSIS — R56.9: ICD-10-CM

## 2018-04-26 DIAGNOSIS — F32.9: ICD-10-CM

## 2018-04-26 DIAGNOSIS — G90.9: ICD-10-CM

## 2018-04-26 DIAGNOSIS — F41.9: ICD-10-CM

## 2018-04-26 DIAGNOSIS — E78.5: ICD-10-CM

## 2018-04-26 DIAGNOSIS — E78.00: ICD-10-CM

## 2018-04-26 DIAGNOSIS — F29: Primary | ICD-10-CM

## 2018-04-26 DIAGNOSIS — E03.9: ICD-10-CM

## 2018-04-26 DIAGNOSIS — E66.9: ICD-10-CM

## 2018-04-26 DIAGNOSIS — B96.20: ICD-10-CM

## 2018-04-26 DIAGNOSIS — F13.239: ICD-10-CM

## 2018-04-26 DIAGNOSIS — Z99.3: ICD-10-CM

## 2018-04-26 DIAGNOSIS — R41.82: ICD-10-CM

## 2018-04-26 DIAGNOSIS — F17.210: ICD-10-CM

## 2018-04-26 DIAGNOSIS — N18.3: ICD-10-CM

## 2018-04-26 PROCEDURE — 80053 COMPREHEN METABOLIC PANEL: CPT

## 2018-04-26 PROCEDURE — 84100 ASSAY OF PHOSPHORUS: CPT

## 2018-04-26 PROCEDURE — 83036 HEMOGLOBIN GLYCOSYLATED A1C: CPT

## 2018-04-26 PROCEDURE — 83735 ASSAY OF MAGNESIUM: CPT

## 2018-04-26 PROCEDURE — 84439 ASSAY OF FREE THYROXINE: CPT

## 2018-04-26 PROCEDURE — 80061 LIPID PANEL: CPT

## 2018-04-26 PROCEDURE — 85025 COMPLETE CBC W/AUTO DIFF WBC: CPT

## 2018-04-26 PROCEDURE — 80048 BASIC METABOLIC PNL TOTAL CA: CPT

## 2018-04-26 PROCEDURE — 84443 ASSAY THYROID STIM HORMONE: CPT

## 2018-04-26 RX ADMIN — FAMOTIDINE SCH MG: 20 TABLET, FILM COATED ORAL at 08:14

## 2018-04-26 RX ADMIN — PHENYTOIN SODIUM SCH MLS/HR: 50 INJECTION INTRAMUSCULAR; INTRAVENOUS at 17:30

## 2018-04-26 RX ADMIN — HEPARIN SODIUM SCH UNITS: 10000 INJECTION, SOLUTION INTRAVENOUS; SUBCUTANEOUS at 06:39

## 2018-04-26 RX ADMIN — VITAMIN D, TAB 1000IU (100/BT) SCH UNITS: 25 TAB at 08:13

## 2018-04-26 RX ADMIN — GABAPENTIN SCH MG: 100 CAPSULE ORAL at 12:29

## 2018-04-26 RX ADMIN — GABAPENTIN SCH MG: 100 CAPSULE ORAL at 17:36

## 2018-04-26 RX ADMIN — CLOTRIMAZOLE SCH APPLIC: 10 CREAM TOPICAL at 13:44

## 2018-04-26 RX ADMIN — PHENYTOIN SODIUM SCH MLS/HR: 50 INJECTION INTRAMUSCULAR; INTRAVENOUS at 08:15

## 2018-04-26 RX ADMIN — POLYVINYL ALCOHOL SCH DROP: 14 SOLUTION/ DROPS OPHTHALMIC at 09:00

## 2018-04-26 RX ADMIN — GABAPENTIN SCH MG: 100 CAPSULE ORAL at 08:14

## 2018-04-26 RX ADMIN — METOPROLOL SUCCINATE SCH MG: 25 TABLET, EXTENDED RELEASE ORAL at 09:35

## 2018-04-26 RX ADMIN — LEVOTHYROXINE SODIUM SCH MCG: 112 TABLET ORAL at 06:38

## 2018-04-26 RX ADMIN — HEPARIN SODIUM SCH UNITS: 10000 INJECTION, SOLUTION INTRAVENOUS; SUBCUTANEOUS at 13:44

## 2018-04-26 RX ADMIN — ACYCLOVIR SCH TAB: 800 TABLET ORAL at 09:35

## 2018-04-26 RX ADMIN — Medication SCH MCG: at 09:35

## 2018-04-26 RX ADMIN — DULOXETINE SCH MG: 60 CAPSULE, DELAYED RELEASE ORAL at 09:17

## 2018-04-26 RX ADMIN — CLOTRIMAZOLE SCH APPLIC: 10 CREAM TOPICAL at 06:36

## 2018-04-26 RX ADMIN — TOLTERODINE TARTRATE SCH MG: 2 CAPSULE, EXTENDED RELEASE ORAL at 21:10

## 2018-04-26 NOTE — HHI.PR
Subjective


Remarks


patient pleasant and cooperative


seen with sitter


denies any pain


per sitter - aite 100% breakfast





Objective


Vitals





Vital Signs








  Date Time  Temp Pulse Resp B/P (MAP) Pulse Ox O2 Delivery O2 Flow Rate FiO2


 


4/26/18 08:05 97.4 56 18 133/65 (87) 95   


 


4/26/18 08:00  61      


 


4/26/18 04:00 97.3 67 18 140/68 (92) 98   


 


4/26/18 04:00  57      


 


4/26/18 00:00  61      


 


4/26/18 00:00 97.6 65 18 110/68 (82) 97   


 


4/25/18 20:00  68      


 


4/25/18 20:00 97.8 65 18 116/56 (76) 95   


 


4/25/18 16:05 97.7 61 18 111/53 (72) 96   


 


4/25/18 16:00  64      


 


4/25/18 12:10 98.2 65 18 103/53 (70) 96   


 


4/25/18 12:00  65      














I/O      


 


 4/25/18 4/25/18 4/25/18 4/26/18 4/26/18 4/26/18





 07:00 15:00 23:00 07:00 15:00 23:00


 


Intake Total 440 ml 756 ml 3000 ml 1384 ml  


 


Output Total    900 ml  


 


Balance 440 ml 756 ml 3000 ml 484 ml  


 


      


 


Intake Oral 440 ml  600 ml 480 ml  


 


IV Total  756 ml 2400 ml 904 ml  


 


Output Urine Total    900 ml  


 


# Voids 6  4   


 


# Bowel Movements 0  0 0  








Result Diagram:  


4/24/18 1107                                                                   

             4/25/18 0817





Imaging





Last Impressions








Head CT 4/24/18 0000 Signed





Impressions: 





 Service Date/Time:  Tuesday, April 24, 2018 17:34 - CONCLUSION:  No acute 





 intracranial findings.     Barry Osuna MD 


 


Chest X-Ray 4/24/18 0000 Signed





Impressions: 





 Service Date/Time:  Tuesday, April 24, 2018 09:52 - CONCLUSION:  1. No acute 





 cardiopulmonary disease.     Cheng Chandra MD 


 


Cervical Spine MRI 4/24/18 0000 Signed





Impressions: 





 Service Date/Time:  Tuesday, April 24, 2018 17:46 - CONCLUSION:  1. No 

fracture 





 or subluxation seen of the cervical spine. 2. Multilevel degenerative changes 

as 





 above. Associated spinal stenosis at C3/C4 and C4/C5 without associated cord 





 signal abnormality. 3. The subcentimeter focus of cord signal abnormality at 

C4 





 is probably related to multiple sclerosis. It is not in the region of spinal 





 stenosis. Patient has extensive white matter changes on comparison brain MRI 





 today.     Ramy Diego MD 


 


Brain MRI 4/24/18 0000 Signed





Impressions: 





 Service Date/Time:  Tuesday, April 24, 2018 17:46 - CONCLUSION:  1. Moderate 

to 





 severe chronic white matter disease, nonspecific but probably related to 





 multiple sclerosis. There is associated atrophy which is considerably beyond 





 that expected for a patient this age. 2. No bleed or acute infarct 

demonstrated. 





     Ramy Diego MD 








Objective Remarks


awake and alert, no  distress, oriented x 3, speech clear


anicteric, moist oral mucosa


neck supple


lungs- no rales


regular rhythm


abdomen-flabby but soft, good bowel sounds


extremities no edema- LE- 0-1/5- able to move toes, good peripheral pulses


UE - good 


sensory intact





A/P


Assessment and Plan


62 years old female








Unnresponsivenss- Resolved - likely seizures


possible benzo withdrawal seizures


LIkely postictal- due to Benzo withdrawal


History of MS


- NH confirmed pt was on xanax 0.5mg po bid 


per NH staff who spoke to our nurse Jeremias pt "she does this all the time" 

regarding her unresponsiveness


EEG normal


Gabapentin 200 mg bid


Welbutrin DC





Psychoses- appropriate and interactive


- started on Risperdal bid by psychiatry





UTI


-continue antibiotics- Rocephin 1 gm IV x 3 days more








KATIE vs acute on chronic kidney dx


- good po, non oliguric


- creatinine stable





HTN- low borderline BPs


- hold meds parameter


- likely with some autonomic dysfucntion with MS





GERD  - PPI


hypothyroidism- synthroid








dvt prophylaxis on heparin


DC to med psychiatry department today if bed available








if stable tomorrow- DC back to med psych











Pan Nieto MD Apr 26, 2018 10:52

## 2018-04-27 VITALS
TEMPERATURE: 97.9 F | RESPIRATION RATE: 20 BRPM | HEART RATE: 72 BPM | SYSTOLIC BLOOD PRESSURE: 154 MMHG | OXYGEN SATURATION: 97 % | DIASTOLIC BLOOD PRESSURE: 68 MMHG

## 2018-04-27 VITALS
DIASTOLIC BLOOD PRESSURE: 72 MMHG | TEMPERATURE: 98.1 F | RESPIRATION RATE: 20 BRPM | SYSTOLIC BLOOD PRESSURE: 137 MMHG | OXYGEN SATURATION: 97 % | HEART RATE: 90 BPM

## 2018-04-27 LAB
BUN SERPL-MCNC: 23 MG/DL (ref 7–18)
CALCIUM SERPL-MCNC: 9.2 MG/DL (ref 8.5–10.1)
CHLORIDE SERPL-SCNC: 106 MEQ/L (ref 98–107)
CHOLEST SERPL-MCNC: 152 MG/DL (ref 120–200)
CHOLESTEROL/ HDL RATIO: 4.06 RATIO
CREAT SERPL-MCNC: 1.39 MG/DL (ref 0.5–1)
GFR SERPLBLD BASED ON 1.73 SQ M-ARVRAT: 38 ML/MIN (ref 89–?)
GLUCOSE SERPL-MCNC: 133 MG/DL (ref 74–106)
HBA1C MFR BLD: 5.8 % (ref 4.3–6)
HCO3 BLD-SCNC: 26.4 MEQ/L (ref 21–32)
HDLC SERPL-MCNC: 37.4 MG/DL (ref 40–60)
LDLC SERPL-MCNC: 78 MG/DL (ref 0–99)
SODIUM SERPL-SCNC: 140 MEQ/L (ref 136–145)
TRIGL SERPL-MCNC: 183 MG/DL (ref 42–150)

## 2018-04-27 RX ADMIN — TOLTERODINE TARTRATE SCH MG: 2 CAPSULE, EXTENDED RELEASE ORAL at 20:44

## 2018-04-27 RX ADMIN — GABAPENTIN SCH MG: 100 CAPSULE ORAL at 13:00

## 2018-04-27 RX ADMIN — GABAPENTIN SCH MG: 100 CAPSULE ORAL at 09:00

## 2018-04-27 RX ADMIN — METOPROLOL SUCCINATE SCH MG: 25 TABLET, EXTENDED RELEASE ORAL at 09:00

## 2018-04-27 RX ADMIN — POLYVINYL ALCOHOL SCH DROP: 14 SOLUTION/ DROPS OPHTHALMIC at 20:44

## 2018-04-27 RX ADMIN — GABAPENTIN SCH MG: 100 CAPSULE ORAL at 17:13

## 2018-04-27 RX ADMIN — CLOTRIMAZOLE SCH APPLIC: 10 CREAM TOPICAL at 13:42

## 2018-04-27 RX ADMIN — LEVOTHYROXINE SODIUM SCH MCG: 112 TABLET ORAL at 06:16

## 2018-04-27 RX ADMIN — NYSTATIN PRN APPLIC: 100000 POWDER TOPICAL at 14:40

## 2018-04-27 RX ADMIN — CLOTRIMAZOLE SCH APPLIC: 10 CREAM TOPICAL at 20:44

## 2018-04-27 RX ADMIN — CYANOCOBALAMIN TAB 1000 MCG SCH MCG: 1000 TAB at 09:00

## 2018-04-27 RX ADMIN — SODIUM CHLORIDE SCH MLS/HR: 900 INJECTION INTRAVENOUS at 11:30

## 2018-04-27 NOTE — PD.CONS
HPI


Service


[facility]


Consult Requested By


 











 


 DR CAROLA DAVID MD


Reason for Consult


MEDICAL MANAGEMENT


Primary Care Physician


No Primary Care Physician


Diagnoses:  


History of Present Illness


Patient is a 62-year-old  female who was recently on our service as an 

inpatient.  Has been medically cleared and transferred to inpatient psychiatry, 

we have been consulted now for further medical management.  Patient has an 

extensive history of multiple sclerosis, hypertension, hyperlipidemia, and GERD





We will make sure she is back on her home medications and continue with 

physical therapy and Occupational Therapy for her multiple sclerosis


Have discussed with patient and RN





Review of Systems


Constitutional:  COMPLAINS OF: Fatigue, DENIES: Diaphoretic episodes, Fever, 

Weight gain, Weight loss, Chills, Dizziness, Change in appetite, Night Sweats


Endocrine:  DENIES: Abnorml menstrual pattern, Heat/cold intolerance, Polydipsia

, Polyuria, Polyphagia


Eyes:  DENIES: Blurred vision, Diplopia, Eye inflammation, Eye pain, Vision loss

, Photosensitivity, Double Vision


Ears, nose, mouth, throat:  DENIES: Tinnitus, Hearing loss, Vertigo, Nasal 

discharge, Oral lesions, Throat pain, Hoarseness, Ear Pain, Running Nose, 

Epistaxis, Sinus Pain


Respiratory:  DENIES: Apneas, Cough, Snoring, Wheezing, Hemoptysis, Sputum 

production, Shortness of breath


Cardiovascular:  DENIES: Chest pain, Palpitations, Syncope, Dyspnea on Exertion

, PND, Lower Extremity Edema, Orthopnea, Claudication


Gastrointestinal:  DENIES: Abdominal pain, Black stools, Bloody stools, 

Constipation, Diarrhea, Nausea, Vomiting, Difficulty Swallowing, Anorexia


Genitourinary:  DENIES: Abnormal vaginal bleeding, Dysmenorrhea, Dyspareunia, 

Sexual dysfunction, Urinary frequency, Urinary incontinence


Musculoskeletal:  COMPLAINS OF: Joint pain, Muscle aches, Stiffness, DENIES: 

Joint Swelling, Back pain, Neck pain


Integumentary:  DENIES: Abnormal pigmentation, Pruritus, Rash, Nail changes, 

Breast masses, Breast skin changes, Nipple discharge


Hematologic/lymphatic:  DENIES: Bruising, Lymphadenopathy


Immunologic/allergic:  DENIES: Eczema, Urticaria


Neurologic:  COMPLAINS OF: Abnormal gait, Localized weakness, Poor Balance, 

DENIES: Headache, Paresthesias, Seizures, Speech Problems, Tremor


Psychiatric:  COMPLAINS OF: Anxiety, Mood changes, Depression, Hallucinations, 

Agitation, DENIES: Confusion


Except as stated in HPI:  all other systems reviewed are Neg





Past Family Social History


Allergies:  


Coded Allergies:  


     hydromorphone (Verified  Allergy, Severe, 4/17/18)


     midazolam (Verified  Allergy, Severe, 4/17/18)


Past Medical History


Hypothyroidism


Multiple sclerosis


Psychiatric disorders


Hypertension 


Hypercholesterolemia


GERD


Obesity


Past Surgical History


Tonsillectomy


Hysterectomy


Reported Medications





Reported Meds & Active Scripts


Active


Reported


Vitamin B-12 (Cyanocobalamin) 500 Mcg Tab 500 Mcg PO DAILY


Tylenol (Acetaminophen) 325 Mg Tab 650 Mg PO Q6H PRN


Cranberry (Cranberry (Vaccinium Macrocarpon)) 425 Mg Cap 425 Mg PO DAILY


Neurontin (Gabapentin) 100 Mg Cap 100 Mg PO TID


Amlodipine (Amlodipine Besylate) 5 Mg Tab 5 Mg PO DAILY


Losartan (Losartan Potassium) 25 Mg Tab 25 Mg PO DAILY


D3 (Cholecalciferol) 2,000 Unit Tab 2,000 Units PO DAILY


Levothyroxine (Levothyroxine Sodium) 112 Mcg Tab 112 Mcg PO DAILY


Myrbetriq (Mirabegron) 25 Mg Tab 25 Mg PO HS


Xanax (Alprazolam) 0.5 Mg Tab 0.5 Mg PO BID


Norco (Hydrocodone-Acetaminophen) 5 Mg-325 Mg Tab 1 Tab PO BID


Multiple Vitamin 1 Tab 1 Tab PO DAILY


Toprol XL (Metoprolol Succinate) 25 Mg Tab 25 Mg PO DAILY


     Hold for SBP < 110 or pulse < 60


Cymbalta DR (Duloxetine HCl) 60 Mg Capdr 60 Mg PO BID


Wellbutrin Xl 24 HR (Bupropion HCl) 150 Mg Tab 150 Mg PO DAILY


Norco (Hydrocodone-Acetaminophen) 5 Mg-325 Mg Tab 1 Tab PO Q6H PRN


Zanaflex (Tizanidine HCl) 4 Mg Cap 4 Mg PO Q6HR


Pepcid AC (Famotidine) 10 Mg Tablet 20 Mg PO DAILY


Clotrimazole Topical (Clotrimazole) 1% Cream 1 Applic TOPICAL Q8HR


     Apply to all skin folds for rash after soap & water wash q-shift


Artificial Tears (Dextran 70/Hypromellose) 1 Each Droperette 1 Drop EACH EYE BID


Risperdal (Risperidone) 0.5 Mg Tab 0.5 Mg PO HS


Active Ordered Medications





Current Medications


Acetaminophen (Tylenol) 650 mg Q4H  PRN PO Pain 1-5 or Temp >101F Last 

administered on 4/27/18at 03:35;  Start 4/26/18 at 20:30


Magnesium Hydroxide (Milk Of Magnesia Liq) 30 ml DAILY  PRN PO CONSTIPATION;  

Start 4/26/18 at 20:30


Al Hydrox/Mg Hydrox/Simethicone (Mag-Al Plus Susp Liq) 30 ml Q6H  PRN PO 

DYSPEPSIA;  Start 4/26/18 at 20:30


Nicotine (Habitrol 21 Mg Patch.24 Hr) 1 patch DAILY  PRN T-DERMAL nicotine 

craving;  Start 4/26/18 at 20:30


Cyanocobalamin (Vitamin B12) 500 mcg DAILY PO  Last administered on 4/27/18at 09

:00;  Start 4/27/18 at 09:00


Levothyroxine Sodium (Synthroid) 112 mcg DAILY@0600 PO  Last administered on 4/ 27/18at 06:16;  Start 4/27/18 at 06:00


Metoprolol Succinate (Toprol Xl) 25 mg DAILY PO  Last administered on 4/27/18at 

09:00;  Start 4/27/18 at 09:00


Tolterodine Tartrate (Detrol La) 2 mg HS PO  Last administered on 4/26/18at 21:

10;  Start 4/26/18 at 21:00


Gabapentin (Neurontin) 200 mg TID PO  Last administered on 4/27/18at 09:00;  

Start 4/27/18 at 09:00


Miscellaneous (Pill Splitter) 1 ea UNSCH  PRN OTHER SEE LABEL COMMENTS;  Start 4 /26/18 at 20:45


Miscellaneous Information 1 DAILY T-DERMAL ;  Start 4/27/18 at 09:00


Gabapentin (Neurontin) 100 mg TID PO ;  Start 4/27/18 at 13:00


Ceftriaxone Sodium 1000 mg/ Sodium Chloride 100 ml @  200 mls/hr Q24H IV ;  

Start 4/27/18 at 11:00


Family History


psychiatric history


Social History


Lives in skilled nursing facility


Denies any tobacco


Denies any illicits


Denies any alcohol





Physical Exam


Vital Signs





Vital Signs








  Date Time  Temp Pulse Resp B/P (MAP) Pulse Ox O2 Delivery O2 Flow Rate FiO2


 


4/27/18 06:33 97.9 72 20 154/68 (96) 97   








Physical Exam


GENERAL: This is a well-nourished, well-developed patient, in no apparent 

distress.


SKIN: No rashes, ecchymoses or lesions. Cool and dry.  Chronic skin changes to 

bilateral lower extremities


HEAD: Atraumatic. Normocephalic. No temporal or scalp tenderness.


EYES: Pupils equal round and reactive. Extraocular motions intact. No scleral 

icterus. No injection or drainage. 


ENT: Nose without bleeding, purulent drainage or septal hematoma. Throat 

without erythema, tonsillar hypertrophy or exudate. Uvula midline. Airway 

patent.


NECK: Trachea midline. No JVD or lymphadenopathy. Supple, nontender, no 

meningeal signs.


CARDIOVASCULAR: Regular rate and rhythm without murmurs, gallops, or rubs.  S1-

S2 no S3 or S4


RESPIRATORY: Clear to auscultation. Breath sounds equal bilaterally. No wheezes

, rales, or rhonchi.  


GASTROINTESTINAL: Abdomen soft, non-tender, nondistended. No hepato-splenomegaly

, or palpable masses. No guarding.


MUSCULOSKELETAL: Extremities without clubbing, cyanosis, or edema. No joint 

tenderness, effusion, or edema noted. No calf tenderness. Negative Homans sign 

bilaterally.


NEUROLOGICAL: Awake and alert. Cranial nerves II through XII intact.  Motor and 

sensory grossly within normal limits.  4 out of 5 muscle strength in all muscle 

groups in bilateral upper extremities.  Patient has bilateral lower extremity 

weakness due to her multiple sclerosis.  Normal speech.


Insight and judgment is limited


Mood and behavior somewhat appropriate


Laboratory





Laboratory Tests








Test


  4/27/18


06:46


 


Blood Urea Nitrogen 23 


 


Creatinine 1.39 


 


Random Glucose 133 


 


Calcium Level 9.2 


 


Sodium Level 140 


 


Potassium Level 4.5 


 


Chloride Level 106 


 


Carbon Dioxide Level 26.4 


 


Anion Gap 8 


 


Estimat Glomerular Filtration


Rate 38 


 


 


Triglycerides Level 183 


 


Cholesterol Level 152 


 


LDL Cholesterol 78 


 


HDL Cholesterol 37.4 


 


Cholesterol/HDL Ratio 4.06 








Result Diagram:  


4/27/18 0646








Assessment and Plan


Assessment and Plan


Unresponsiveness- Resolved - likely seizures


possible benzo withdrawal seizures


Likely postictal- due to Benzo withdrawal





History of MS-continue on physical therapy and Occupational Therapy


- NH confirmed pt was on xanax 0.5mg po bid 


per NH staff who spoke to our nurse michael Mcdowell "she does this all the time" 

regarding her unresponsiveness


EEG normal


Gabapentin 200 mg bid


Wellbutrin DC





Psychoses- appropriate and interactive


- started on Risperdal bid by psychiatry





UTI with E. coli and Proteus species


-continue antibiotics- Rocephin 1 gm IV x 3 days more








KATIE vs acute on chronic kidney dx


- good po, non oliguric


- creatinine stable





HTN- low borderline BPs


- hold meds parameter


- likely with some autonomic dysfunction with multiple sclerosis





GERD  - PPI


hypothyroidism- Synthroid


Code Status


Full code


Discussed Condition With


Discussed with RN and patient











Arnol Juarez DO Apr 27, 2018 10:20

## 2018-04-27 NOTE — HHI.PYPN
Subjective


Remarks


Patient is a 62-year-old  woman, , domiciled in a health and 

rehab facility for the past year, unemployed on SSI and disability income, with 

a past psychiatric history of depression and anxiety, no previous psychiatric 

admissions, no previous suicide attempts or self-injurious behavior, currently 

on Wellbutrin  mg p.o. daily, Cymbalta 60 mg p.o. twice daily and 

gabapentin 100 mg p.o. 3 times daily was brought in under [place] act initiated 

by provider at her living facility due to psychosis suicide ideations as well 

as patient attempted to put a cord around her neck in the context of altered 

mental status which patient was admitted to the inpatient psychiatry unit for 

further evaluation and management.  During initial hospitalization, patient was 

found to be unresponsive with some generalized twitching, normal vital signs 

which rapid response team was activated with concerns of possible seizure 

activity and was transported to medical floor for further medical workup.  

Patient was maintained in medical floor for evaluation with possible seizures 

secondary to benzo withdrawal and was medically cleared and transferred back to 

the inpatient psychiatry unit for further management.  Patient was seen lying 

hospital bed noted B, cooperative.  Discussion nursing staff reported the 

patient noted to be confused to the circumstances that brought her into the 

hospital.  Patient states that she was brought to the hospital due to a person 

named Bianka "with my boss" had put an electrical report around her neck and 

states that this was done at her work which brought the pharmacists had saw 

this occur and had patient brought to the hospital.  Patient states that her 

mood has been "fine" denying any perceptual services.  Patient states that she 

is concerned that her brother was an accident that she had heard this through 

the news and that her brother is here at [facility] burn unit and was 

concerned about him.  Patient is aware that she is in the hospital and waiting 

to return back to her residence.





Review of Systems


Except as stated in HPI:  all other systems reviewed are Neg





Mental Status Examination


Appearance:  Appropriate


Consciousness:  Alert


Orientation:  Person, Place


Speech:  Unremarkable


Language:  Adequate


Fund of Knowledge:  Inadequate


Attention and Concentration:  Adequate


Memory:  Impaired


Mood:  Good


Affect:  Appropriate


Thought Process & Associations:  Linear


Thought Content:  Delusional


Hallucination Type:  None


Delusion Type:  Bizarre


Suicidal Ideation:  No


Suicidal Plan:  No


Suicidal Intention:  No


Homicidal Ideation:  No


Homicidal Plan:  No


Homicidal Intention:  No


Insight:  Poor


Judgment:  Poor





Results


Labs


Labs reviewed








Test


  4/27/18


06:46


 


Blood Urea Nitrogen 23 MG/DL 


 


Creatinine 1.39 MG/DL 


 


Random Glucose 133 MG/DL 


 


Calcium Level 9.2 MG/DL 


 


Sodium Level 140 MEQ/L 


 


Potassium Level 4.5 MEQ/L 


 


Chloride Level 106 MEQ/L 


 


Carbon Dioxide Level 26.4 MEQ/L 


 


Anion Gap 8 MEQ/L 


 


Estimat Glomerular Filtration


Rate 38 ML/MIN 


 


 


Hemoglobin A1c 5.8 % 


 


Triglycerides Level 183 MG/DL 


 


Cholesterol Level 152 MG/DL 


 


LDL Cholesterol 78 MG/DL 


 


HDL Cholesterol 37.4 MG/DL 


 


Cholesterol/HDL Ratio 4.06 RATIO 








Vitals/IOs





Vital Signs








  Date Time  Temp Pulse Resp B/P (MAP) Pulse Ox O2 Delivery O2 Flow Rate FiO2


 


4/27/18 06:33 97.9 72 20 154/68 (96) 97   














Intake and Output   


 


 4/27/18 4/27/18 4/28/18





 08:00 16:00 00:00


 


Intake Total 240 ml 360 ml 


 


Output Total 1000 ml  


 


Balance -760 ml 360 ml 











Assessment & Plan


Problem List:  


(1) Unspecified psychosis


ICD Codes:  F29 - Unspecified psychosis not due to a substance or known 

physiological condition


Status:  Acute


Assessment & Plan


Patient this time continues to have confusion due to circumstances of brought 

to the hospital for some bizarre delusions of brother having been involved in 

an accident and currently hospitalized here at [place] in a burn unit.  Patient 

not noted to be endorsing any perceptional services that she initially did 

during initial part of hospitalization.  Patient likely to have baseline 

confusion secondary to sequelae from MS.  We will continue current treatment.  

Continue monitor mood and behavior.  The patient not noted to be having active 

psychotic symptoms for the next couple of days patient will likely be for 

discharge back to her residence.  Discharge planning in progress.


Justification for Cont. Inpt.


At risk for further decompensation if at lower level of care











Wilbur tSevens MD Apr 27, 2018 17:32

## 2018-04-28 VITALS
SYSTOLIC BLOOD PRESSURE: 185 MMHG | HEART RATE: 82 BPM | TEMPERATURE: 98 F | RESPIRATION RATE: 18 BRPM | OXYGEN SATURATION: 97 % | DIASTOLIC BLOOD PRESSURE: 86 MMHG

## 2018-04-28 LAB
ALBUMIN SERPL-MCNC: 2.7 GM/DL (ref 3.4–5)
ALP SERPL-CCNC: 121 U/L (ref 45–117)
ALT SERPL-CCNC: 32 U/L (ref 10–53)
AST SERPL-CCNC: 31 U/L (ref 15–37)
BASOPHILS # BLD AUTO: 0.1 TH/MM3 (ref 0–0.2)
BASOPHILS NFR BLD: 0.6 % (ref 0–2)
BILIRUB SERPL-MCNC: 0.3 MG/DL (ref 0.2–1)
BUN SERPL-MCNC: 23 MG/DL (ref 7–18)
CALCIUM SERPL-MCNC: 10.1 MG/DL (ref 8.5–10.1)
CHLORIDE SERPL-SCNC: 105 MEQ/L (ref 98–107)
CREAT SERPL-MCNC: 1.25 MG/DL (ref 0.5–1)
EOSINOPHIL # BLD: 0.2 TH/MM3 (ref 0–0.4)
EOSINOPHIL NFR BLD: 1.5 % (ref 0–4)
ERYTHROCYTE [DISTWIDTH] IN BLOOD BY AUTOMATED COUNT: 13.7 % (ref 11.6–17.2)
GFR SERPLBLD BASED ON 1.73 SQ M-ARVRAT: 43 ML/MIN (ref 89–?)
GLUCOSE SERPL-MCNC: 179 MG/DL (ref 74–106)
HCO3 BLD-SCNC: 27 MEQ/L (ref 21–32)
HCT VFR BLD CALC: 48.1 % (ref 35–46)
HGB BLD-MCNC: 16 GM/DL (ref 11.6–15.3)
LYMPHOCYTES # BLD AUTO: 2.6 TH/MM3 (ref 1–4.8)
LYMPHOCYTES NFR BLD AUTO: 19.4 % (ref 9–44)
MAGNESIUM SERPL-MCNC: 2.1 MG/DL (ref 1.5–2.5)
MCH RBC QN AUTO: 32.2 PG (ref 27–34)
MCHC RBC AUTO-ENTMCNC: 33.3 % (ref 32–36)
MCV RBC AUTO: 96.7 FL (ref 80–100)
MONOCYTE #: 0.8 TH/MM3 (ref 0–0.9)
MONOCYTES NFR BLD: 5.7 % (ref 0–8)
NEUTROPHILS # BLD AUTO: 9.9 TH/MM3 (ref 1.8–7.7)
NEUTROPHILS NFR BLD AUTO: 72.8 % (ref 16–70)
PHOSPHATE SERPL-MCNC: 2.3 MG/DL (ref 2.5–4.9)
PLATELET # BLD: 201 TH/MM3 (ref 150–450)
PMV BLD AUTO: 9.6 FL (ref 7–11)
PROT SERPL-MCNC: 8.1 GM/DL (ref 6.4–8.2)
RBC # BLD AUTO: 4.97 MIL/MM3 (ref 4–5.3)
SODIUM SERPL-SCNC: 142 MEQ/L (ref 136–145)
T4 FREE SERPL-MCNC: 1.27 NG/DL (ref 0.76–1.46)
WBC # BLD AUTO: 13.5 TH/MM3 (ref 4–11)

## 2018-04-28 RX ADMIN — GABAPENTIN SCH MG: 100 CAPSULE ORAL at 13:10

## 2018-04-28 RX ADMIN — GABAPENTIN SCH MG: 100 CAPSULE ORAL at 09:29

## 2018-04-28 RX ADMIN — CLOTRIMAZOLE SCH APPLIC: 10 CREAM TOPICAL at 13:10

## 2018-04-28 RX ADMIN — POLYVINYL ALCOHOL SCH DROP: 14 SOLUTION/ DROPS OPHTHALMIC at 09:27

## 2018-04-28 RX ADMIN — SODIUM CHLORIDE SCH MLS/HR: 900 INJECTION INTRAVENOUS at 11:20

## 2018-04-28 RX ADMIN — GABAPENTIN SCH MG: 100 CAPSULE ORAL at 17:35

## 2018-04-28 RX ADMIN — ACYCLOVIR SCH TAB: 800 TABLET ORAL at 09:29

## 2018-04-28 RX ADMIN — METOPROLOL SUCCINATE SCH MG: 25 TABLET, EXTENDED RELEASE ORAL at 09:28

## 2018-04-28 RX ADMIN — NYSTATIN PRN APPLIC: 100000 POWDER TOPICAL at 09:49

## 2018-04-28 RX ADMIN — FAMOTIDINE SCH MG: 20 TABLET, FILM COATED ORAL at 09:27

## 2018-04-28 RX ADMIN — POLYVINYL ALCOHOL SCH DROP: 14 SOLUTION/ DROPS OPHTHALMIC at 09:47

## 2018-04-28 RX ADMIN — CYANOCOBALAMIN TAB 1000 MCG SCH MCG: 1000 TAB at 09:45

## 2018-04-28 RX ADMIN — VITAMIN D, TAB 1000IU (100/BT) SCH UNITS: 25 TAB at 09:45

## 2018-04-28 RX ADMIN — POLYVINYL ALCOHOL SCH DROP: 14 SOLUTION/ DROPS OPHTHALMIC at 09:42

## 2018-04-28 RX ADMIN — POLYVINYL ALCOHOL SCH DROP: 14 SOLUTION/ DROPS OPHTHALMIC at 21:00

## 2018-04-28 RX ADMIN — LEVOTHYROXINE SODIUM SCH MCG: 112 TABLET ORAL at 05:12

## 2018-04-28 RX ADMIN — TOLTERODINE TARTRATE SCH MG: 2 CAPSULE, EXTENDED RELEASE ORAL at 21:00

## 2018-04-28 RX ADMIN — CLOTRIMAZOLE SCH APPLIC: 10 CREAM TOPICAL at 21:10

## 2018-04-28 RX ADMIN — CLOTRIMAZOLE SCH APPLIC: 10 CREAM TOPICAL at 05:12

## 2018-04-28 NOTE — HHI.PR
Subjective


Remarks


Patient is a 62-year-old  female who was recently on our service as an 

inpatient.  Has been medically cleared and transferred to inpatient psychiatry, 

we have been consulted now for further medical management.  Patient has an 

extensive history of multiple sclerosis, hypertension, hyperlipidemia, and GERD





We will make sure she is back on her home medications and continue with 

physical therapy and Occupational Therapy for her multiple sclerosis


Have discussed with patient and RN





4-28 PATIENT STATES " THE TV IS THE DOORBELL FOR THE FRONT DOOR"


NO NEW COMPLAINTS


WILL NEED TO GO BACK TO SNF/detention AT [place]





Objective


Vitals





Vital Signs








  Date Time  Temp Pulse Resp B/P (MAP) Pulse Ox O2 Delivery O2 Flow Rate FiO2


 


4/28/18 06:00 98.0 82 18 185/86 (119) 97   


 


4/27/18 17:52 98.1 90 20 137/72 (93) 97   














I/O      


 


 4/27/18 4/27/18 4/27/18 4/28/18 4/28/18 4/28/18





 07:00 15:00 23:00 07:00 15:00 23:00


 


Intake Total 240 ml 360 ml 720 ml 0 ml 360 ml 


 


Output Total 1000 ml  625 ml 375 ml  


 


Balance -760 ml 360 ml 95 ml -375 ml 360 ml 


 


      


 


Intake Oral 240 ml 360 ml 720 ml 0 ml 360 ml 


 


Output Urine Total 1000 ml  625 ml 375 ml  








Result Diagram:  


4/28/18 0925 4/28/18 0925





Other Results





 Laboratory Tests








Test


  4/27/18


06:46 4/28/18


09:25


 


Blood Urea Nitrogen 23 MG/DL  23 MG/DL 


 


Creatinine 1.39 MG/DL  1.25 MG/DL 


 


Random Glucose 133 MG/DL  179 MG/DL 


 


Calcium Level 9.2 MG/DL  10.1 MG/DL 


 


Sodium Level 140 MEQ/L  142 MEQ/L 


 


Potassium Level 4.5 MEQ/L  3.9 MEQ/L 


 


Chloride Level 106 MEQ/L  105 MEQ/L 


 


Carbon Dioxide Level 26.4 MEQ/L  27.0 MEQ/L 


 


Anion Gap 8 MEQ/L  10 MEQ/L 


 


Estimat Glomerular Filtration


Rate 38 ML/MIN 


  43 ML/MIN 


 


 


Hemoglobin A1c 5.8 %  


 


Triglycerides Level 183 MG/DL  


 


Cholesterol Level 152 MG/DL  


 


LDL Cholesterol 78 MG/DL  


 


HDL Cholesterol 37.4 MG/DL  


 


Cholesterol/HDL Ratio 4.06 RATIO  


 


White Blood Count  13.5 TH/MM3 


 


Red Blood Count  4.97 MIL/MM3 


 


Hemoglobin  16.0 GM/DL 


 


Hematocrit  48.1 % 


 


Mean Corpuscular Volume  96.7 FL 


 


Mean Corpuscular Hemoglobin  32.2 PG 


 


Mean Corpuscular Hemoglobin


Concent 


  33.3 % 


 


 


Red Cell Distribution Width  13.7 % 


 


Platelet Count  201 TH/MM3 


 


Mean Platelet Volume  9.6 FL 


 


Neutrophils (%) (Auto)  72.8 % 


 


Lymphocytes (%) (Auto)  19.4 % 


 


Monocytes (%) (Auto)  5.7 % 


 


Eosinophils (%) (Auto)  1.5 % 


 


Basophils (%) (Auto)  0.6 % 


 


Neutrophils # (Auto)  9.9 TH/MM3 


 


Lymphocytes # (Auto)  2.6 TH/MM3 


 


Monocytes # (Auto)  0.8 TH/MM3 


 


Eosinophils # (Auto)  0.2 TH/MM3 


 


Basophils # (Auto)  0.1 TH/MM3 


 


CBC Comment  DIFF FINAL 


 


Differential Comment   


 


Total Protein  8.1 GM/DL 


 


Albumin  2.7 GM/DL 


 


Phosphorus Level  2.3 MG/DL 


 


Magnesium Level  2.1 MG/DL 


 


Alkaline Phosphatase  121 U/L 


 


Aspartate Amino Transf


(AST/SGOT) 


  31 U/L 


 


 


Alanine Aminotransferase


(ALT/SGPT) 


  32 U/L 


 


 


Total Bilirubin  0.3 MG/DL 


 


Free Thyroxine  1.27 NG/DL 


 


Thyroid Stimulating Hormone


3rd Gen 


  2.760 uIU/ML 


 








Objective Remarks


GENERAL: Awake alert and oriented 1-2 talkative and cooperative but confused


SKIN: Warm and dry.


HEAD: Atraumatic. Normocephalic. 


EYES: Pupils equal and round. No scleral icterus. No injection or drainage.  

Her ocular muscles intact


ENT: No nasal bleeding or discharge.  Mucous membranes pink and moist.  Tongue 

is midline


NECK: Trachea midline. No JVD.  Supple


CARDIOVASCULAR: Regular rate and rhythm.  S1-S2 no S3 or S4


RESPIRATORY: No accessory muscle use. Clear to auscultation. Breath sounds 

equal bilaterally. 


GASTROINTESTINAL: Abdomen soft, non-tender, nondistended. Hepatic and splenic 

margins not palpable. 


MUSCULOSKELETAL: Extremities without clubbing, cyanosis, or edema. No obvious 

deformities. 


NEUROLOGICAL: Awake and alert. No obvious cranial nerve deficits.  Motor 

grossly within normal limits. 4 out of 5 muscle strength in the arms.  There is 

weakness in bilateral lower extremities not able to stand at this time. normal 

speech.


PSYCHIATRIC: INAppropriate mood and affect; insight and judgment ABnormal.


Procedures


NONE


Medications and IVs





Current Medications


Acetaminophen (Tylenol) 650 mg Q4H  PRN PO Pain 1-5 or Temp >101F Last 

administered on 4/27/18at 03:35;  Start 4/26/18 at 20:30


Magnesium Hydroxide (Milk Of Magnesia Liq) 30 ml DAILY  PRN PO CONSTIPATION;  

Start 4/26/18 at 20:30


Al Hydrox/Mg Hydrox/Simethicone (Mag-Al Plus Susp Liq) 30 ml Q6H  PRN PO 

DYSPEPSIA;  Start 4/26/18 at 20:30


Nicotine (Habitrol 21 Mg Patch.24 Hr) 1 patch DAILY  PRN T-DERMAL nicotine 

craving;  Start 4/26/18 at 20:30


Cyanocobalamin (Vitamin B12) 500 mcg DAILY PO  Last administered on 4/28/18at 09

:45;  Start 4/27/18 at 09:00


Levothyroxine Sodium (Synthroid) 112 mcg DAILY@0600 PO  Last administered on 4/ 28/18at 05:12;  Start 4/27/18 at 06:00


Metoprolol Succinate (Toprol Xl) 25 mg DAILY PO  Last administered on 4/28/18at 

09:28;  Start 4/27/18 at 09:00


Tolterodine Tartrate (Detrol La) 2 mg HS PO  Last administered on 4/27/18at 20:

44;  Start 4/26/18 at 21:00


Gabapentin (Neurontin) 200 mg TID PO  Last administered on 4/28/18at 09:29;  

Start 4/27/18 at 09:00


Miscellaneous (Pill Splitter) 1 ea UNSCH  PRN OTHER SEE LABEL COMMENTS;  Start 4 /26/18 at 20:45


Miscellaneous Information 1 DAILY T-DERMAL ;  Start 4/27/18 at 09:00


Gabapentin (Neurontin) 100 mg TID PO ;  Start 4/27/18 at 13:00;  Stop 4/27/18 

at 16:27;  Status DC


Ceftriaxone Sodium 1000 mg/ Sodium Chloride 100 ml @  200 mls/hr Q24H IV  Last 

administered on 4/27/18at 11:30;  Start 4/27/18 at 11:00


Amlodipine Besylate (Norvasc) 5 mg DAILY PO  Last administered on 4/28/18at 09:

27;  Start 4/28/18 at 09:00


Clotrimazole (Lotrimin 1% Cream) 1 applic Q8HR TOPICAL  Last administered on 4/ 28/18at 05:12;  Start 4/27/18 at 14:00


Cholecalciferol (Vitamin D3) 2,000 units DAILY PO ;  Start 4/28/18 at 09:00


Artificial Tears (Tears Naturale Opth Soln) 1 drop BID EACH EYE  Last 

administered on 4/28/18at 09:27;  Start 4/27/18 at 21:00


Famotidine (Pepcid) 20 mg DAILY PO  Last administered on 4/28/18at 09:27;  

Start 4/28/18 at 09:00


Multivitamins (Theragran) 1 tab DAILY PO  Last administered on 4/28/18at 09:29;

  Start 4/28/18 at 09:00


Nystatin (Mycostatin Powder) 1 applic UNSCH  PRN TOPICAL IRRITATION/REDNESS/

EXCORIATION Last administered on 4/28/18at 09:49;  Start 4/27/18 at 13:45


Risperidone (risperDAL) 0.5 mg BID PO  Last administered on 4/28/18at 09:28;  

Start 4/27/18 at 21:00


Clonidine (Catapres) 0.1 mg Q4H  PRN PO SBP>160, DBP>90;  Start 4/28/18 at 09:00





A/P


Problem List:  


(1) Multiple sclerosis


ICD Code:  G35 - Multiple sclerosis


Status:  Chronic


(2) Wheelchair bound


ICD Code:  Z99.3 - Dependence on wheelchair


(3) HTN (hypertension)


ICD Code:  I10 - Essential (primary) hypertension


Status:  Chronic


(4) Unspecified psychosis


ICD Code:  F29 - Unspecified psychosis not due to a substance or known 

physiological condition


Status:  Acute


Assessment and Plan


Unresponsiveness- Resolved - likely seizures


possible benzo withdrawal seizures


Likely postictal- due to Benzo withdrawal





History of MS-continue on physical therapy and Occupational Therapy


- NH confirmed pt was on xanax 0.5mg po bid 


per NH staff who spoke to our nurse Jeremias pt "she does this all the time" 

regarding her unresponsiveness


EEG normal


Gabapentin 200 mg bid


Wellbutrin DC





Psychoses- appropriate and interactive


- started on Risperdal bid by psychiatry





UTI with E. coli and Proteus species


-continue antibiotics- Rocephin 1 gm IV x 3 days more








KATIE vs acute on chronic kidney dx


- good po, non oliguric


- creatinine stable





HTN- low borderline BPs


- hold meds parameter


- likely with some autonomic dysfunction with multiple sclerosis





GERD  - PPI


hypothyroidism- Synthroid


Discharge Planning


AWAITING PSYCHIATRIC CLEARANCE











Arnol Juarez DO Apr 28, 2018 11:11

## 2018-04-28 NOTE — HHI.PYPN
Subjective


Remarks


On psychiatric evaluation today the patient is calm, cooperative, reports good 

mood, good appetite, good sleep. She has been taking medications, no 

significant side effects. She denies SI/HI/VH/AH. As per nurses, she has been 

disorganized, internally stimulated, delusional. This morning she as saying the 

she was seen two dogs in her room.





Review of Systems


Except as stated in HPI:  all other systems reviewed are Neg





Mental Status Examination


Appearance:  Appropriate


Consciousness:  Alert


Orientation:  Person, Place


Speech:  Unremarkable


Language:  Adequate


Fund of Knowledge:  Inadequate


Attention and Concentration:  Adequate


Memory:  Impaired


Mood:  Good


Affect:  Appropriate


Thought Process & Associations:  Linear


Thought Content:  Delusional


Hallucination Type:  None


Delusion Type:  Bizarre


Suicidal Ideation:  No


Suicidal Plan:  No


Suicidal Intention:  No


Homicidal Ideation:  No


Homicidal Plan:  No


Homicidal Intention:  No


Insight:  Poor


Judgment:  Poor





Results


Labs











Test


  4/28/18


09:25


 


White Blood Count 13.5 TH/MM3 


 


Red Blood Count 4.97 MIL/MM3 


 


Hemoglobin 16.0 GM/DL 


 


Hematocrit 48.1 % 


 


Mean Corpuscular Volume 96.7 FL 


 


Mean Corpuscular Hemoglobin 32.2 PG 


 


Mean Corpuscular Hemoglobin


Concent 33.3 % 


 


 


Red Cell Distribution Width 13.7 % 


 


Platelet Count 201 TH/MM3 


 


Mean Platelet Volume 9.6 FL 


 


Neutrophils (%) (Auto) 72.8 % 


 


Lymphocytes (%) (Auto) 19.4 % 


 


Monocytes (%) (Auto) 5.7 % 


 


Eosinophils (%) (Auto) 1.5 % 


 


Basophils (%) (Auto) 0.6 % 


 


Neutrophils # (Auto) 9.9 TH/MM3 


 


Lymphocytes # (Auto) 2.6 TH/MM3 


 


Monocytes # (Auto) 0.8 TH/MM3 


 


Eosinophils # (Auto) 0.2 TH/MM3 


 


Basophils # (Auto) 0.1 TH/MM3 


 


CBC Comment DIFF FINAL 


 


Differential Comment  


 


Blood Urea Nitrogen 23 MG/DL 


 


Creatinine 1.25 MG/DL 


 


Random Glucose 179 MG/DL 


 


Total Protein 8.1 GM/DL 


 


Albumin 2.7 GM/DL 


 


Calcium Level 10.1 MG/DL 


 


Phosphorus Level 2.3 MG/DL 


 


Magnesium Level 2.1 MG/DL 


 


Alkaline Phosphatase 121 U/L 


 


Aspartate Amino Transf


(AST/SGOT) 31 U/L 


 


 


Alanine Aminotransferase


(ALT/SGPT) 32 U/L 


 


 


Total Bilirubin 0.3 MG/DL 


 


Sodium Level 142 MEQ/L 


 


Potassium Level 3.9 MEQ/L 


 


Chloride Level 105 MEQ/L 


 


Carbon Dioxide Level 27.0 MEQ/L 


 


Anion Gap 10 MEQ/L 


 


Estimat Glomerular Filtration


Rate 43 ML/MIN 


 


 


Free Thyroxine 1.27 NG/DL 


 


Thyroid Stimulating Hormone


3rd Gen 2.760 uIU/ML 


 








Vitals/IOs





Vital Signs








  Date Time  Temp Pulse Resp B/P (MAP) Pulse Ox O2 Delivery O2 Flow Rate FiO2


 


4/28/18 06:00 98.0 82 18 185/86 (119) 97   














Intake and Output   


 


 4/28/18 4/28/18 4/29/18





 08:00 16:00 00:00


 


Intake Total 0 ml 360 ml 


 


Output Total 375 ml  


 


Balance -375 ml 360 ml 











Assessment & Plan


Problem List:  


(1) Unspecified psychosis


ICD Codes:  F29 - Unspecified psychosis not due to a substance or known 

physiological condition


Status:  Acute


Assessment & Plan:  Increase Risperdal 1 mg bid for psychosis. 





Assessment & Plan


Estimated LOS:  days


Justification for Cont. Inpt.


Patient continues to be acutely psychotic.











Raoul Rosenthal MD Apr 28, 2018 11:11

## 2018-04-29 VITALS
SYSTOLIC BLOOD PRESSURE: 158 MMHG | HEART RATE: 79 BPM | OXYGEN SATURATION: 93 % | TEMPERATURE: 97.4 F | RESPIRATION RATE: 16 BRPM | DIASTOLIC BLOOD PRESSURE: 76 MMHG

## 2018-04-29 VITALS
RESPIRATION RATE: 16 BRPM | SYSTOLIC BLOOD PRESSURE: 207 MMHG | DIASTOLIC BLOOD PRESSURE: 93 MMHG | OXYGEN SATURATION: 97 % | TEMPERATURE: 97.5 F | HEART RATE: 76 BPM

## 2018-04-29 LAB — HBA1C MFR BLD: 5.6 % (ref 4.3–6)

## 2018-04-29 RX ADMIN — CLOTRIMAZOLE SCH APPLIC: 10 CREAM TOPICAL at 14:40

## 2018-04-29 RX ADMIN — FAMOTIDINE SCH MG: 20 TABLET, FILM COATED ORAL at 12:27

## 2018-04-29 RX ADMIN — POLYVINYL ALCOHOL SCH DROP: 14 SOLUTION/ DROPS OPHTHALMIC at 09:46

## 2018-04-29 RX ADMIN — ACYCLOVIR SCH TAB: 800 TABLET ORAL at 09:47

## 2018-04-29 RX ADMIN — CYANOCOBALAMIN TAB 1000 MCG SCH MCG: 1000 TAB at 09:47

## 2018-04-29 RX ADMIN — CLOTRIMAZOLE SCH APPLIC: 10 CREAM TOPICAL at 05:42

## 2018-04-29 RX ADMIN — VITAMIN D, TAB 1000IU (100/BT) SCH UNITS: 25 TAB at 09:47

## 2018-04-29 RX ADMIN — CLOTRIMAZOLE SCH APPLIC: 10 CREAM TOPICAL at 20:55

## 2018-04-29 RX ADMIN — TOLTERODINE TARTRATE SCH MG: 2 CAPSULE, EXTENDED RELEASE ORAL at 20:50

## 2018-04-29 RX ADMIN — GABAPENTIN SCH MG: 100 CAPSULE ORAL at 12:28

## 2018-04-29 RX ADMIN — SODIUM CHLORIDE SCH MLS/HR: 900 INJECTION INTRAVENOUS at 11:53

## 2018-04-29 RX ADMIN — ACYCLOVIR SCH TAB: 800 TABLET ORAL at 12:25

## 2018-04-29 RX ADMIN — METOPROLOL SUCCINATE SCH MG: 25 TABLET, EXTENDED RELEASE ORAL at 09:47

## 2018-04-29 RX ADMIN — LEVOTHYROXINE SODIUM SCH MCG: 112 TABLET ORAL at 05:42

## 2018-04-29 RX ADMIN — GABAPENTIN SCH MG: 100 CAPSULE ORAL at 17:27

## 2018-04-29 RX ADMIN — NYSTATIN PRN APPLIC: 100000 POWDER TOPICAL at 15:00

## 2018-04-29 RX ADMIN — METOPROLOL SUCCINATE SCH MG: 25 TABLET, EXTENDED RELEASE ORAL at 12:25

## 2018-04-29 RX ADMIN — FAMOTIDINE SCH MG: 20 TABLET, FILM COATED ORAL at 09:46

## 2018-04-29 RX ADMIN — POLYVINYL ALCOHOL SCH DROP: 14 SOLUTION/ DROPS OPHTHALMIC at 20:55

## 2018-04-29 RX ADMIN — GABAPENTIN SCH MG: 100 CAPSULE ORAL at 09:46

## 2018-04-29 RX ADMIN — CYANOCOBALAMIN TAB 1000 MCG SCH MCG: 1000 TAB at 12:27

## 2018-04-29 RX ADMIN — VITAMIN D, TAB 1000IU (100/BT) SCH UNITS: 25 TAB at 12:28

## 2018-04-29 NOTE — HHI.PYPN
Subjective


Remarks


Patient was seen today for psychiatric reevaluation, the patient was 

oppositional, resistant, was found sleeping, I tried to awaken her, she opened 

her eyes, but stated that she does want to talk to me.  As per conversation 

with nursing charge, the patient has being these oppositional for the last hours

, she is selectively mute.  But she is taking her medications.  Eating okay, 

sleeping okay.





Review of Systems


Except as stated in HPI:  all other systems reviewed are Neg





Mental Status Examination


Appearance:  Appropriate


Consciousness:  Alert


Orientation:  Person, Place


Speech:  Unremarkable


Language:  Adequate


Fund of Knowledge:  Inadequate


Attention and Concentration:  Adequate


Memory:  Impaired


Mood:  Oppositional


Affect:  Irritable


Thought Process & Associations:  Linear


Thought Content:  Delusional


Hallucination Type:  None


Delusion Type:  Bizarre


Suicidal Ideation:  No


Suicidal Plan:  No


Suicidal Intention:  No


Homicidal Ideation:  No


Homicidal Plan:  No


Homicidal Intention:  No


Insight:  Poor


Judgment:  Poor





Results


Vitals/IOs





Vital Signs








  Date Time  Temp Pulse Resp B/P (MAP) Pulse Ox O2 Delivery O2 Flow Rate FiO2


 


4/29/18 07:17 97.4 79 16 158/76 (103) 93   














Intake and Output   


 


 4/29/18 4/29/18 4/30/18





 08:00 16:00 00:00


 


Intake Total 0 ml  


 


Output Total 150 ml  


 


Balance -150 ml  











Assessment & Plan


Problem List:  


(1) Unspecified psychosis


ICD Codes:  F29 - Unspecified psychosis not due to a substance or known 

physiological condition


Status:  Acute


Assessment & Plan:  Patient will continue current psychotropic regimen.





Assessment & Plan


Estimated LOS:  days


Justification for Cont. Inpt.


Patient continues to be psychotic











Raoul Rosenthal MD Apr 29, 2018 10:44

## 2018-04-29 NOTE — HHI.PR
Subjective


Remarks


Patient seen and examined this morning.  She was unwilling to talk to me.  

Afebrile vital signs stable.





Objective


Vitals





Vital Signs








  Date Time  Temp Pulse Resp B/P (MAP) Pulse Ox O2 Delivery O2 Flow Rate FiO2


 


4/29/18 07:17 97.4 79 16 158/76 (103) 93   














I/O      


 


 4/28/18 4/28/18 4/28/18 4/29/18 4/29/18 4/29/18





 07:00 15:00 23:00 07:00 15:00 23:00


 


Intake Total 0 ml 1680 ml 360 ml 0 ml  


 


Output Total 375 ml 400 ml 245 ml 150 ml  


 


Balance -375 ml 1280 ml 115 ml -150 ml  


 


      


 


Intake Oral 0 ml 1680 ml 360 ml 0 ml  


 


Output Urine Total 375 ml 400 ml 245 ml 150 ml  








Result Diagram:  


4/28/18 0925 4/28/18 0925





Objective Remarks


GEN: Well-developed, obese  female patient. No acute distress.  

Sleeping arousable unwilling to talk to me


CV: Regular rate and rhythm without obvious murmurs


LUNGS: Clear to auscultation bilaterally. Normal respiratory effort. No wheezes

, rales, rhonchi.


GI: Soft, nontender, nondistended. No palpable masses. Bowel sounds WNL.


EXT: No edema.


NEURO/PSYCH: An appropriate mood and affect, unwilling to speak so unable to 

evaluate orientation poor judgment.


Procedures


NONE


Medications and IVs





Current Medications








 Medications


  (Trade)  Dose


 Ordered  Sig/Leonela


 Route  Start Time


 Stop Time Status Last Admin


 


  (Tylenol)  650 mg  Q4H  PRN


 PO  4/26/18 20:30


    4/27/18 03:35


 


 


  (Milk Of


 Magnesia Liq)  30 ml  DAILY  PRN


 PO  4/26/18 20:30


     


 


 


  (Mag-Al Plus


 Susp Liq)  30 ml  Q6H  PRN


 PO  4/26/18 20:30


     


 


 


  (Habitrol 21 Mg


 Patch.24 Hr)  1 patch  DAILY  PRN


 T-DERMAL  4/26/18 20:30


     


 


 


  (Vitamin B12)  500 mcg  DAILY


 PO  4/27/18 09:00


    4/28/18 09:45


 


 


  (Synthroid)  112 mcg  DAILY@0600


 PO  4/27/18 06:00


    4/28/18 05:12


 


 


  (Toprol Xl)  25 mg  DAILY


 PO  4/27/18 09:00


    4/28/18 09:28


 


 


  (Detrol La)  2 mg  HS


 PO  4/26/18 21:00


    4/27/18 20:44


 


 


  (Neurontin)  200 mg  TID


 PO  4/27/18 09:00


    4/28/18 17:35


 


 


  (Pill Splitter)  1 ea  UNSCH  PRN


 OTHER  4/26/18 20:45


     


 


 


 Miscellaneous


 Information  1  DAILY


 T-DERMAL  4/27/18 09:00


     


 


 


 Ceftriaxone


 Sodium 1000 mg/


 Sodium Chloride  100 ml @ 


 200 mls/hr  Q24H


 IV  4/27/18 11:00


    4/28/18 11:20


 


 


  (Norvasc)  5 mg  DAILY


 PO  4/28/18 09:00


    4/28/18 09:27


 


 


  (Lotrimin 1%


 Cream)  1 applic  Q8HR


 TOPICAL  4/27/18 14:00


    4/28/18 13:10


 


 


  (Vitamin D3)  2,000 units  DAILY


 PO  4/28/18 09:00


     


 


 


  (Tears Naturale


 Opth Soln)  1 drop  BID


 EACH EYE  4/27/18 21:00


    4/28/18 09:27


 


 


  (Pepcid)  20 mg  DAILY


 PO  4/28/18 09:00


    4/28/18 09:27


 


 


  (Theragran)  1 tab  DAILY


 PO  4/28/18 09:00


    4/28/18 09:29


 


 


  (Mycostatin


 Powder)  1 applic  UNSCH  PRN


 TOPICAL  4/27/18 13:45


    4/28/18 09:49


 


 


  (Catapres)  0.1 mg  Q4H  PRN


 PO  4/28/18 09:00


     


 


 


  (risperDAL)  1 mg  BID


 PO  4/28/18 21:00


     


 











A/P


Problem List:  


(1) Multiple sclerosis


ICD Code:  G35 - Multiple sclerosis


Status:  Chronic


(2) Wheelchair bound


ICD Code:  Z99.3 - Dependence on wheelchair


(3) HTN (hypertension)


ICD Code:  I10 - Essential (primary) hypertension


Status:  Chronic


(4) Unspecified psychosis


ICD Code:  F29 - Unspecified psychosis not due to a substance or known 

physiological condition


Status:  Acute


Assessment and Plan


This is a 62-year-old  female with multiple sclerosis currently having 

an unspecified psychosis episode.





Unresponsiveness- Resolved - likely seizures


possible benzo withdrawal seizures


Likely postictal- due to Benzo withdrawal





History of MS-continue on physical therapy and Occupational Therapy


- NH confirmed pt was on xanax 0.5mg po bid 


per NH staff who spoke to our nurse Jeremias pt "she does this all the time" 

regarding her unresponsiveness


EEG normal


Gabapentin 200 mg bid


Wellbutrin DC





Psychoses- appropriate and interactive


- started on Risperdal bid by psychiatry


-Following psychiatry recommendation





UTI with E. coli and Proteus species


-continue antibiotics- Rocephin 1 gm IV x 3 days more








KATIE vs acute on chronic kidney dx


- good po, non oliguric


- creatinine stable





HTN- low borderline BPs


- hold meds parameter


- likely with some autonomic dysfunction with multiple sclerosis





GERD  - PPI


hypothyroidism- Synthroid


Discharge Planning


Discharge pending psychiatry clearance











Sukhjinder Shelton MD R3 Apr 29, 2018 11:10

## 2018-04-30 VITALS
OXYGEN SATURATION: 96 % | SYSTOLIC BLOOD PRESSURE: 177 MMHG | DIASTOLIC BLOOD PRESSURE: 81 MMHG | RESPIRATION RATE: 17 BRPM | HEART RATE: 80 BPM | TEMPERATURE: 97.5 F

## 2018-04-30 VITALS
HEART RATE: 84 BPM | OXYGEN SATURATION: 96 % | RESPIRATION RATE: 16 BRPM | DIASTOLIC BLOOD PRESSURE: 80 MMHG | SYSTOLIC BLOOD PRESSURE: 162 MMHG | TEMPERATURE: 97.6 F

## 2018-04-30 VITALS — DIASTOLIC BLOOD PRESSURE: 96 MMHG | HEART RATE: 83 BPM | SYSTOLIC BLOOD PRESSURE: 171 MMHG

## 2018-04-30 VITALS — HEART RATE: 80 BPM | SYSTOLIC BLOOD PRESSURE: 165 MMHG | DIASTOLIC BLOOD PRESSURE: 67 MMHG

## 2018-04-30 VITALS — SYSTOLIC BLOOD PRESSURE: 160 MMHG | HEART RATE: 80 BPM | DIASTOLIC BLOOD PRESSURE: 76 MMHG

## 2018-04-30 RX ADMIN — POLYVINYL ALCOHOL SCH DROP: 14 SOLUTION/ DROPS OPHTHALMIC at 08:44

## 2018-04-30 RX ADMIN — METOPROLOL SUCCINATE SCH MG: 25 TABLET, EXTENDED RELEASE ORAL at 08:44

## 2018-04-30 RX ADMIN — GABAPENTIN SCH MG: 100 CAPSULE ORAL at 12:36

## 2018-04-30 RX ADMIN — POLYVINYL ALCOHOL SCH DROP: 14 SOLUTION/ DROPS OPHTHALMIC at 22:22

## 2018-04-30 RX ADMIN — CLOTRIMAZOLE SCH APPLIC: 10 CREAM TOPICAL at 12:18

## 2018-04-30 RX ADMIN — GABAPENTIN SCH MG: 100 CAPSULE ORAL at 16:56

## 2018-04-30 RX ADMIN — NYSTATIN PRN APPLIC: 100000 POWDER TOPICAL at 14:40

## 2018-04-30 RX ADMIN — SODIUM CHLORIDE SCH MLS/HR: 900 INJECTION INTRAVENOUS at 11:00

## 2018-04-30 RX ADMIN — GABAPENTIN SCH MG: 100 CAPSULE ORAL at 17:32

## 2018-04-30 RX ADMIN — GABAPENTIN SCH MG: 100 CAPSULE ORAL at 08:44

## 2018-04-30 RX ADMIN — LEVOTHYROXINE SODIUM SCH MCG: 112 TABLET ORAL at 05:03

## 2018-04-30 RX ADMIN — TOLTERODINE TARTRATE SCH MG: 2 CAPSULE, EXTENDED RELEASE ORAL at 22:22

## 2018-04-30 RX ADMIN — FAMOTIDINE SCH MG: 20 TABLET, FILM COATED ORAL at 08:44

## 2018-04-30 RX ADMIN — CLOTRIMAZOLE SCH APPLIC: 10 CREAM TOPICAL at 22:22

## 2018-04-30 RX ADMIN — CYANOCOBALAMIN TAB 1000 MCG SCH MCG: 1000 TAB at 08:44

## 2018-04-30 RX ADMIN — VITAMIN D, TAB 1000IU (100/BT) SCH UNITS: 25 TAB at 08:44

## 2018-04-30 RX ADMIN — ACYCLOVIR SCH TAB: 800 TABLET ORAL at 08:44

## 2018-04-30 RX ADMIN — CLOTRIMAZOLE SCH APPLIC: 10 CREAM TOPICAL at 05:03
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
## 2018-04-30 NOTE — HHI.PR
Subjective


Remarks


Blood pressures remain elevated.  Patient's baseline status is wheelchair use, 

minimal ambulation.  No new complaints from the patient today.  Urinary tract 

infection, acute kidney injury, and recent withdrawal seizure all appear to 

have stabilized.  Blood pressures are not yet stabilized.





Objective





Vital Signs








  Date Time  Temp Pulse Resp B/P (MAP) Pulse Ox O2 Delivery O2 Flow Rate FiO2


 


4/30/18 09:27  83  171/96 (121)    


 


4/30/18 05:52 97.5 80 17 177/81 (113) 96   


 


4/29/18 17:46 97.5 76 16 207/93 (131) 97   














I/O      


 


 4/29/18 4/29/18 4/29/18 4/30/18 4/30/18 4/30/18





 07:00 15:00 23:00 07:00 15:00 23:00


 


Intake Total 0 ml  840 ml 60 ml  


 


Output Total 150 ml  400 ml 300 ml  


 


Balance -150 ml  440 ml -240 ml  


 


      


 


Intake Oral 0 ml  840 ml 60 ml  


 


Output Urine Total 150 ml  400 ml 300 ml  


 


# Voids   2   


 


# Bowel Movements   1   








Result Diagram:  


4/28/18 0925 4/28/18 0925





Objective Remarks


GENERAL: NAD, A&Ox3


HEAD: Normocephalic. 


NECK: Supple, trachea midline. No lymphadenopathy.


EYES: No scleral icterus. No injection or drainage. 


CARDIOVASCULAR: Regular rate and rhythm without murmurs, gallops, or rubs. 


RESPIRATORY: Breath sounds equal bilaterally. No accessory muscle use.


GASTROINTESTINAL: Abdomen soft, non-tender, nondistended. 


MUSCULOSKELETAL: No cyanosis, or edema. 


SKIN: Warm and dry.


NEURO:  No focal neurological deficitis.





A/P


Problem List:  


(1) Wheelchair bound


ICD Code:  Z99.3 - Dependence on wheelchair


(2) HTN (hypertension)


ICD Code:  I10 - Essential (primary) hypertension


Status:  Chronic


(3) Multiple sclerosis


ICD Code:  G35 - Multiple sclerosis


Status:  Chronic


(4) Unspecified psychosis


ICD Code:  F29 - Unspecified psychosis not due to a substance or known 

physiological condition


Status:  Acute


Assessment and Plan


62-year-old  female with multiple sclerosis currently having an 

unspecified psychosis episode.





Transient altered mental status 


Benzodiazepine withdrawal seizure 


No recurrence 


Monitor clinically 


EEG was normal





History of MS


Chronic wheelchair bound


continue on physical therapy and Occupational Therapy


Chronic disability from MS


Continue gabapentin 200 mg twice daily





Psychoses


Improving


Continue Risperdal


Continue treatments per psychiatry recommendations





UTI 


E. coli and Proteus species


Rocephin 1 gm IV till 5/2/18





KATIE


Resolving





HTN


Not yet controlled


Amlodipine increased


Continue as needed clonidine





GERD


Continue PPI





Hypothyroidism


Continue Synthroid





Discharge Planning


Blood pressures not yet controlled











Ren Slade MD Apr 30, 2018 10:21

## 2018-05-01 VITALS
SYSTOLIC BLOOD PRESSURE: 110 MMHG | DIASTOLIC BLOOD PRESSURE: 58 MMHG | OXYGEN SATURATION: 95 % | HEART RATE: 81 BPM | TEMPERATURE: 98 F | RESPIRATION RATE: 16 BRPM

## 2018-05-01 VITALS
HEART RATE: 83 BPM | SYSTOLIC BLOOD PRESSURE: 165 MMHG | TEMPERATURE: 97.3 F | OXYGEN SATURATION: 95 % | RESPIRATION RATE: 17 BRPM | DIASTOLIC BLOOD PRESSURE: 81 MMHG

## 2018-05-01 VITALS — DIASTOLIC BLOOD PRESSURE: 79 MMHG | HEART RATE: 89 BPM | SYSTOLIC BLOOD PRESSURE: 160 MMHG

## 2018-05-01 LAB
ALBUMIN SERPL-MCNC: 2.6 GM/DL (ref 3.4–5)
ALP SERPL-CCNC: 103 U/L (ref 45–117)
ALT SERPL-CCNC: 26 U/L (ref 10–53)
AST SERPL-CCNC: 17 U/L (ref 15–37)
BASOPHILS # BLD AUTO: 0.1 TH/MM3 (ref 0–0.2)
BASOPHILS NFR BLD: 0.9 % (ref 0–2)
BILIRUB SERPL-MCNC: 0.3 MG/DL (ref 0.2–1)
BUN SERPL-MCNC: 32 MG/DL (ref 7–18)
CALCIUM SERPL-MCNC: 10 MG/DL (ref 8.5–10.1)
CHLORIDE SERPL-SCNC: 106 MEQ/L (ref 98–107)
CREAT SERPL-MCNC: 1.32 MG/DL (ref 0.5–1)
EOSINOPHIL # BLD: 0.2 TH/MM3 (ref 0–0.4)
EOSINOPHIL NFR BLD: 1.6 % (ref 0–4)
ERYTHROCYTE [DISTWIDTH] IN BLOOD BY AUTOMATED COUNT: 13.6 % (ref 11.6–17.2)
GFR SERPLBLD BASED ON 1.73 SQ M-ARVRAT: 41 ML/MIN (ref 89–?)
GLUCOSE SERPL-MCNC: 174 MG/DL (ref 74–106)
HCO3 BLD-SCNC: 27.3 MEQ/L (ref 21–32)
HCT VFR BLD CALC: 42 % (ref 35–46)
HGB BLD-MCNC: 14.2 GM/DL (ref 11.6–15.3)
LYMPHOCYTES # BLD AUTO: 2.4 TH/MM3 (ref 1–4.8)
LYMPHOCYTES NFR BLD AUTO: 20.1 % (ref 9–44)
MAGNESIUM SERPL-MCNC: 2.6 MG/DL (ref 1.5–2.5)
MCH RBC QN AUTO: 32.7 PG (ref 27–34)
MCHC RBC AUTO-ENTMCNC: 33.8 % (ref 32–36)
MCV RBC AUTO: 96.7 FL (ref 80–100)
MONOCYTE #: 1.2 TH/MM3 (ref 0–0.9)
MONOCYTES NFR BLD: 9.7 % (ref 0–8)
NEUTROPHILS # BLD AUTO: 8 TH/MM3 (ref 1.8–7.7)
NEUTROPHILS NFR BLD AUTO: 67.7 % (ref 16–70)
PHOSPHATE SERPL-MCNC: 3.2 MG/DL (ref 2.5–4.9)
PLATELET # BLD: 233 TH/MM3 (ref 150–450)
PMV BLD AUTO: 9.5 FL (ref 7–11)
PROT SERPL-MCNC: 7.3 GM/DL (ref 6.4–8.2)
RBC # BLD AUTO: 4.35 MIL/MM3 (ref 4–5.3)
SODIUM SERPL-SCNC: 140 MEQ/L (ref 136–145)
WBC # BLD AUTO: 11.8 TH/MM3 (ref 4–11)

## 2018-05-01 RX ADMIN — LEVOTHYROXINE SODIUM SCH MCG: 112 TABLET ORAL at 06:00

## 2018-05-01 RX ADMIN — CLOTRIMAZOLE SCH APPLIC: 10 CREAM TOPICAL at 20:48

## 2018-05-01 RX ADMIN — CYANOCOBALAMIN TAB 1000 MCG SCH MCG: 1000 TAB at 09:00

## 2018-05-01 RX ADMIN — CLOTRIMAZOLE SCH APPLIC: 10 CREAM TOPICAL at 06:00

## 2018-05-01 RX ADMIN — GABAPENTIN SCH MG: 100 CAPSULE ORAL at 16:46

## 2018-05-01 RX ADMIN — TOLTERODINE TARTRATE SCH MG: 2 CAPSULE, EXTENDED RELEASE ORAL at 20:47

## 2018-05-01 RX ADMIN — VITAMIN D, TAB 1000IU (100/BT) SCH UNITS: 25 TAB at 09:00

## 2018-05-01 RX ADMIN — METOPROLOL SUCCINATE SCH MG: 25 TABLET, EXTENDED RELEASE ORAL at 09:00

## 2018-05-01 RX ADMIN — POLYVINYL ALCOHOL SCH DROP: 14 SOLUTION/ DROPS OPHTHALMIC at 20:48

## 2018-05-01 RX ADMIN — GABAPENTIN SCH MG: 100 CAPSULE ORAL at 13:00

## 2018-05-01 RX ADMIN — NIFEDIPINE SCH MG: 60 TABLET, FILM COATED, EXTENDED RELEASE ORAL at 21:00

## 2018-05-01 RX ADMIN — POLYVINYL ALCOHOL SCH DROP: 14 SOLUTION/ DROPS OPHTHALMIC at 09:00

## 2018-05-01 RX ADMIN — GABAPENTIN SCH MG: 100 CAPSULE ORAL at 09:00

## 2018-05-01 RX ADMIN — ACYCLOVIR SCH TAB: 800 TABLET ORAL at 09:00

## 2018-05-01 RX ADMIN — LIDOCAINE HYDROCHLORIDE SCH ML: 10 INJECTION, SOLUTION INFILTRATION; PERINEURAL at 16:00

## 2018-05-01 RX ADMIN — FAMOTIDINE SCH MG: 20 TABLET, FILM COATED ORAL at 09:00

## 2018-05-01 RX ADMIN — CLOTRIMAZOLE SCH APPLIC: 10 CREAM TOPICAL at 13:35

## 2018-05-01 RX ADMIN — NYSTATIN PRN APPLIC: 100000 POWDER TOPICAL at 19:15

## 2018-05-01 NOTE — HHI.PR
Subjective


Remarks


Follow-up for UTI, hypertension.  Patient is currently doing well.  Resting in 

bed.  No chest pain, shortness of breath, fever or chills.





Objective


Vitals





Vital Signs








  Date Time  Temp Pulse Resp B/P (MAP) Pulse Ox O2 Delivery O2 Flow Rate FiO2


 


5/1/18 04:28 97.3 83 17 165/81 (109) 95   


 


4/30/18 18:08 97.6 84 16 162/80 (107) 96   


 


4/30/18 14:10  80  160/76 (104)    














I/O      


 


 4/30/18 4/30/18 4/30/18 5/1/18 5/1/18 5/1/18





 07:00 15:00 23:00 07:00 15:00 23:00


 


Intake Total 60 ml  960 ml 60 ml  


 


Output Total 300 ml  200 ml 150 ml  


 


Balance -240 ml  760 ml -90 ml  


 


      


 


Intake Oral 60 ml  960 ml 60 ml  


 


Output Urine Total 300 ml  200 ml 150 ml  








Result Diagram:  


5/1/18 0720 5/1/18 0720





Objective Remarks


GENERAL: Alert, NAD.


SKIN: Warm and dry.


HEAD: Normocephalic.


EYES: No scleral icterus. No injection or drainage. 


NECK: Supple, trachea midline. No JVD or lymphadenopathy.


CARDIOVASCULAR: Regular rate and rhythm without murmurs, gallops, or rubs. 


RESPIRATORY: Breath sounds equal bilaterally. No accessory muscle use.


GASTROINTESTINAL: Abdomen soft, non-tender, nondistended. 


MUSCULOSKELETAL: No cyanosis, or edema. 


BACK: Nontender without obvious deformity. No CVA tenderness.





Procedures


NONE





A/P


Problem List:  


(1) Multiple sclerosis


ICD Code:  G35 - Multiple sclerosis


Status:  Chronic


(2) Wheelchair bound


ICD Code:  Z99.3 - Dependence on wheelchair


(3) HTN (hypertension)


ICD Code:  I10 - Essential (primary) hypertension


Status:  Chronic


(4) Unspecified psychosis


ICD Code:  F29 - Unspecified psychosis not due to a substance or known 

physiological condition


Status:  Acute


Assessment and Plan


62-year-old  female with multiple sclerosis currently having an 

unspecified psychosis episode.





Transient altered mental status 


Benzodiazepine withdrawal seizure 


No recurrence 


Monitor clinically 


EEG was normal





History of MS


Chronic wheelchair bound


continue on physical therapy and Occupational Therapy


Chronic disability from MS


Continue gabapentin 200 mg twice daily





Psychoses


Improving


Continue Risperdal


Continue treatments per psychiatry recommendations





UTI 


E. coli and Proteus species


Will switch to Rocephin 1 gm IM till 5/2/18. Patient can be discharged back to 

rehab tomorrow 5/2/2018. 





CKD stage III - Creatinine 1.32 which appears to be baseline. 





HTN


Systolic BP in the 160s. Currently on Amlodipine 10mg Qday. 


Will switch to Nifedipine 60mg BID for better BP control. If needed, we will 

consider PO hydralazine. 





GERD


Will switch Pepcid to Protonix 40mg Qday. 





Hypothyroidism


Continue Synthroid 112 mcg Qday. 





Full code. ambulation.











Cole Braden DO May 1, 2018 1:44 pm
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
## 2018-05-01 NOTE — PD.TTN
Patient Problems


1. Discharge planning


2. Medication compliance


3. Knowledge deficit


4. Lack of coping skills





Progress Toward Goals


Provider Present:  Dr. BARBRA Stevens


Provider Input:  


04/30/2018: patient is being assess for med adjustment and treatment needs


Nurse(s) Present:  TONY Ly


Nurse(s) Input:  


04/30/2018: Patient is redirectable, eating and no behavior concerns


Psychiatric Counselors Present:  NOEMI Lindquist


Psych Therapist Input:  


04/30/2018 patient will be assess for dc needs


Group Spec/RT/OT/BERGER Present:  Jasiel Hermosillo OT


Group Spec/RT/OT/BERGER Input:  


04/30/2018; patient is unable to participate with groups at this time





Documentation


Scribe:  Jeanette Alvarez May 1, 2018 11:47

## 2018-05-02 VITALS
DIASTOLIC BLOOD PRESSURE: 88 MMHG | RESPIRATION RATE: 17 BRPM | TEMPERATURE: 97.8 F | HEART RATE: 82 BPM | SYSTOLIC BLOOD PRESSURE: 180 MMHG

## 2018-05-02 RX ADMIN — ACYCLOVIR SCH TAB: 800 TABLET ORAL at 10:11

## 2018-05-02 RX ADMIN — GABAPENTIN SCH MG: 100 CAPSULE ORAL at 10:12

## 2018-05-02 RX ADMIN — LIDOCAINE HYDROCHLORIDE SCH ML: 10 INJECTION, SOLUTION INFILTRATION; PERINEURAL at 09:00

## 2018-05-02 RX ADMIN — METOPROLOL SUCCINATE SCH MG: 25 TABLET, EXTENDED RELEASE ORAL at 10:11

## 2018-05-02 RX ADMIN — VITAMIN D, TAB 1000IU (100/BT) SCH UNITS: 25 TAB at 10:11

## 2018-05-02 RX ADMIN — LEVOTHYROXINE SODIUM SCH MCG: 112 TABLET ORAL at 06:00

## 2018-05-02 RX ADMIN — POLYVINYL ALCOHOL SCH DROP: 14 SOLUTION/ DROPS OPHTHALMIC at 09:00

## 2018-05-02 RX ADMIN — NIFEDIPINE SCH MG: 60 TABLET, FILM COATED, EXTENDED RELEASE ORAL at 10:11

## 2018-05-02 RX ADMIN — CYANOCOBALAMIN TAB 1000 MCG SCH MCG: 1000 TAB at 09:00

## 2018-05-02 RX ADMIN — CLOTRIMAZOLE SCH APPLIC: 10 CREAM TOPICAL at 06:00

## 2018-05-02 NOTE — HHI.DS
Psychiatry Discharge Summary


Inpatient Psychiatric care?:  Yes


Advance Directive:  No


Reason Not Provided:  pt not interested


Mental Health AdvanceDirective:  No


Health Care Proxy:  Yes


Admission


Admission Date


Apr 26, 2018 at 18:53


Admission Diagnosis:  


(1) Unspecified psychosis


ICD Code:  F29 - Unspecified psychosis not due to a substance or known 

physiological condition


Tobacco Use In Past 30 Days:  5 or More Cigarettes/Day


Alcohol Use:  Never





Results


Blood Pressure


180 / 88





Vital Signs








  Date Time  Temp Pulse Resp B/P (MAP) Pulse Ox O2 Delivery O2 Flow Rate FiO2


 


5/2/18 06:15 97.8 82 17 180/88 (118)    


 


5/1/18 18:13     95   











Laboratory Tests








Test


  5/1/18


07:20


 


White Blood Count


  11.8 TH/MM3


(4.0-11.0)


 


Monocytes (%) (Auto)


  9.7 %


(0.0-8.0)


 


Neutrophils # (Auto)


  8.0 TH/MM3


(1.8-7.7)


 


Monocytes # (Auto)


  1.2 TH/MM3


(0-0.9)


 


Blood Urea Nitrogen


  32 MG/DL


(7-18)


 


Creatinine


  1.32 MG/DL


(0.50-1.00)


 


Random Glucose


  174 MG/DL


()


 


Albumin


  2.6 GM/DL


(3.4-5.0)


 


Magnesium Level


  2.6 MG/DL


(1.5-2.5)


 


Estimat Glomerular Filtration


Rate 41 ML/MIN


(>89)








 Laboratory Results








Test


  4/27/18


06:46 4/28/18


09:25


 


Cholesterol Level


  152 MG/DL


(120-200) 


 


 


HDL Cholesterol


  37.4 MG/DL


(40.0-60.0) 


 


 


LDL Cholesterol


  78 MG/DL


(0-99) 


 


 


Triglycerides Level


  183 MG/DL


() 


 


 


Hemoglobin A1c


  


  5.6 %


(4.3-6.0)











Medications


Approp Antipsych med options


1 - Minimum of three failed multiple trials of monotherapy.


2 - Documented plan to taper to monotherapy due to previous use of multiple 

meds OR cross-taper in progress at D/C.


3 - Documentation of augmentation of Clozapine.


4 - Justification other than those listed in allowable values 1-3, document here

:





Discharge Instructions


Scheduled Appointment:  [facility] Provider


Appointment Date:  May 3, 2018


Appointment Time:  09:00am





Mental Status Examination


Appearance:  Appropriate


Consciousness:  Alert


Orientation:  Person, Place, Date/Time


Speech:  Unremarkable


Language:  Adequate


Fund of Knowledge:  Inadequate


Attention and Concentration:  Adequate


Memory:  Impaired


Mood:  Appropriate


Affect:  Appropriate


Thought Process & Associations:  Other ([place])


Thought Content:  Delusional


Hallucination Type:  None


Delusion Type:  Bizarre


Suicidal Ideation:  No


Suicidal Plan:  No


Suicidal Intention:  No


Homicidal Ideation:  No


Homicidal Plan:  No


Homicidal Intention:  No


Insight:  Poor


Judgment:  Poor





Discharge/Advance Care Plan


Health Problems:  


(1) Unspecified psychosis


Goals to promote your health


* To prevent worsening of your condition and complications


* To maintain your health at the optimal level


Directions to meet your goals


*** Take your medications as prescribed


***  Follow your dietary instruction


***  Follow activity as directed





***  Keep your appointments as scheduled


***  Take your immunizations and boosters as scheduled


***  If your symptoms worsen call your PCP, if no PCP go to Urgent Care Center 

or Emergency Room ***


***  For 24/7 questions related to your inpatient stay or results of tests 

pending at discharge, please contact Dr. Wilbur Stevens at [phone]


***  Smoking is Dangerous to Your Health. Avoid second hand smoking ***











Wilbur Stevens MD May 2, 2018 10:29